# Patient Record
Sex: FEMALE | Race: BLACK OR AFRICAN AMERICAN | Employment: OTHER | URBAN - METROPOLITAN AREA
[De-identification: names, ages, dates, MRNs, and addresses within clinical notes are randomized per-mention and may not be internally consistent; named-entity substitution may affect disease eponyms.]

---

## 2017-01-23 ENCOUNTER — OFFICE VISIT (OUTPATIENT)
Dept: FAMILY MEDICINE CLINIC | Age: 47
End: 2017-01-23

## 2017-01-23 VITALS
BODY MASS INDEX: 50.79 KG/M2 | HEART RATE: 70 BPM | WEIGHT: 276 LBS | HEIGHT: 62 IN | RESPIRATION RATE: 18 BRPM | SYSTOLIC BLOOD PRESSURE: 129 MMHG | DIASTOLIC BLOOD PRESSURE: 84 MMHG | TEMPERATURE: 98 F | OXYGEN SATURATION: 98 %

## 2017-01-23 DIAGNOSIS — R20.0 NUMBNESS OF RIGHT HAND: Primary | ICD-10-CM

## 2017-01-23 DIAGNOSIS — R73.9 ELEVATED BLOOD SUGAR: ICD-10-CM

## 2017-01-23 DIAGNOSIS — I10 ESSENTIAL HYPERTENSION: ICD-10-CM

## 2017-01-23 RX ORDER — TOPIRAMATE 200 MG/1
50 TABLET ORAL 2 TIMES DAILY
COMMUNITY
End: 2017-10-16

## 2017-01-23 RX ORDER — POTASSIUM CHLORIDE 750 MG/1
TABLET, FILM COATED, EXTENDED RELEASE ORAL
Qty: 180 TAB | Refills: 1 | Status: SHIPPED | OUTPATIENT
Start: 2017-01-23 | End: 2018-05-31 | Stop reason: SDUPTHER

## 2017-01-23 NOTE — PROGRESS NOTES
Pt here c/o numbness in right fingers since last night. Denies having any pain at this time. Reports feeling weak and having no energy as well. Also c/o decrease in appetite x several weeks.

## 2017-01-23 NOTE — PROGRESS NOTES
Pt here c/o numbness in right fingers since last night. Denies having any pain at this time. Reports feeling weak and having no energy as well. Also c/o decrease in appetite x several weeks. Pt reports that she is having difficulty with depressed mood, is seeing a therapist. Pt reports that she lost her dog of 18 years 2 days ago. Subjective: (As above and below)     Chief Complaint   Patient presents with    Numbness     in right fingers     she is a 55y.o. year old female who presents for evaluation. Reviewed PmHx, RxHx, FmHx, SocHx, AllgHx and updated in chart. Review of Systems - negative except as listed above    Objective:     Vitals:    01/23/17 1451   BP: 129/84   Pulse: 70   Resp: 18   Temp: 98 °F (36.7 °C)   TempSrc: Oral   SpO2: 98%   Weight: 276 lb (125.2 kg)   Height: 5' 2\" (1.575 m)     Physical Examination: General appearance - alert, well appearing, and in no distress  Mental status - depressed, tearful  Eyes - pupils equal and reactive, extraocular eye movements intact  Mouth - mucous membranes moist, pharynx normal without lesions  Chest - clear to auscultation, no wheezes, rales or rhonchi, symmetric air entry  Heart - normal rate, regular rhythm, normal S1, S2, no murmurs, rubs, clicks or gallops  Musculoskeletal - no joint tenderness, deformity or swelling  Extremities - numbness in tips of right 2-4 fingers    Assessment/ Plan:   1. Numbness of right hand  -check EMG, symptoms intermittent with high risk repetitive job  - EMG TWO EXTREMITIES UPPER; Future    2. Essential hypertension  -well controlled  - CBC WITH AUTOMATED DIFF  - LIPID PANEL  - METABOLIC PANEL, COMPREHENSIVE  - TSH 3RD GENERATION  - VITAMIN D, 25 HYDROXY    3. Elevated blood sugar  -check labs  - HEMOGLOBIN A1C WITH EAG     Follow-up Disposition: As needed  I have discussed the diagnosis with the patient and the intended plan as seen in the above orders.   The patient has received an after-visit summary and questions were answered concerning future plans.      Medication Side Effects and Warnings were discussed with patient: yes  Patient Labs were reviewed: yes  Patient Past Records were reviewed:  yes    Margaret Mortimer, M.D.

## 2017-01-23 NOTE — MR AVS SNAPSHOT
Visit Information Date & Time Provider Department Dept. Phone Encounter #  
 1/23/2017  2:40 PM Tamra Stone MD [facility] [phone] [id number] Upcoming Health Maintenance Date Due Pneumococcal 19-64 Medium Risk (1 of 1 - PPSV23) 3/2/1989 DTaP/Tdap/Td series (1 - Tdap) 3/2/1991 PAP AKA CERVICAL CYTOLOGY 3/2/1991 INFLUENZA AGE 9 TO ADULT 8/1/2016 Allergies as of 1/23/2017  Review Complete On: 1/23/2017 By: Tamra Stone MD  
  
 Severity Noted Reaction Type Reactions Chocolate Flavor [Flavoring Agent] High 09/12/2014    Anaphylaxis Hazelnut [Tree Nut] High 09/12/2014    Anaphylaxis [facility] 09/12/2014    Anaphylaxis Other Medication High 05/02/2012    Anaphylaxis NUTS Peanut High 09/12/2014    Anaphylaxis Sunflower Oil High 09/12/2014    Anaphylaxis Amlodipine  09/12/2014    Itching Eye swelling Celebrex [Celecoxib]  05/02/2012    Itching Clindamycin  05/02/2012    Itching Doxycycline  05/02/2012    Itching Flagyl [Metronidazole]  05/02/2012    Itching Hydrocodone  09/12/2014    Hives, Itching Eye swelling Levofloxacin  09/12/2014    Hives, Shortness of Breath, Itching Lortab [Hydrocodone-acetaminophen]  05/02/2012    Itching Pcn [Penicillins]  05/02/2012    Itching Prednisone  05/02/2012    Itching Sesame Oil  09/12/2014    Hives, Shortness of Breath, Itching Shellfish Containing Products  05/02/2012    Itching Current Immunizations  Reviewed on 7/16/2015 No immunizations on file. Not reviewed this visit You Were Diagnosed With   
  
 Codes Comments Numbness of right hand    -  Primary ICD-10-CM: R20.0 ICD-9-CM: 782.0 Essential hypertension     ICD-10-CM: I10 
ICD-9-CM: 401.9 Elevated blood sugar     ICD-10-CM: R73.9 ICD-9-CM: 790.29 Vitals BP Pulse Temp Resp Height(growth percentile) Weight(growth percentile) 129/84 (BP 1 Location: Right arm, BP Patient Position: Sitting) 70 98 °F (36.7 °C) (Oral) 18 5' 2\" (1.575 m) 276 lb (125.2 kg) LMP SpO2 BMI OB Status Smoking Status 12/27/2016 (Approximate) 98% 50.48 kg/m2 Having regular periods Never Smoker Vitals History BMI and BSA Data Body Mass Index Body Surface Area 50.48 kg/m 2 2.34 m 2 Preferred Pharmacy Pharmacy Name Phone [facility] [address] [place] [address], [address] [place] [address] [phone] Your Updated Medication List  
  
   
This list is accurate as of: 1/23/17  3:20 PM.  Always use your most recent med list.  
  
  
  
  
 Kali New 250-50 mcg/dose diskus inhaler Generic drug:  fluticasone-salmeterol Take 1 Puff by inhalation every twelve (12) hours. * albuterol 5 mg/mL nebulizer solution Commonly known as:  PROVENTIL  
by Nebulization route every six (6) hours as needed. * PROAIR HFA 90 mcg/actuation inhaler Generic drug:  albuterol  
  
 albuterol-ipratropium 2.5 mg-0.5 mg/3 ml Nebu Commonly known as:  DUO-NEB  
3 mL by Nebulization route four (4) times daily. azelastine-fluticasone 137-50 mcg/spray [place]  
by Nasal route. butalbital-acetaminophen-caffeine -40 mg per tablet Commonly known as:  [employer] Take 1 Tab by mouth every six (6) hours as needed. Max Daily Amount: 4 Tabs. calcium 500 mg Tab Take  by mouth two (2) times a day. citalopram 20 mg tablet Commonly known as:  Rima Bongo Take 1 Tab by mouth daily. EPIPEN 0.3 mg/0.3 mL injection Generic drug:  EPINEPHrine  
0.3 mg by IntraMUSCular route once as needed. ergocalciferol 50,000 unit capsule Commonly known as:  ERGOCALCIFEROL  
  
 esomeprazole 40 mg capsule Commonly known as:  NEXIUM  
TAKE 1 CAPSULE BY MOUTH DAILY ferrous sulfate 325 mg (65 mg iron) tablet Take  by mouth two (2) times a day. fluconazole 150 mg tablet Commonly known as:  DIFLUCAN Take 1 Tab by mouth daily. guaiFENesin-codeine 100-10 mg/5 mL solution Commonly known as:  Elly Sera Take 5 mL by mouth three (3) times daily as needed for Cough. Max Daily Amount: 15 mL.  
  
 hydroCHLOROthiazide 25 mg tablet Commonly known as:  HYDRODIURIL Take 1 Tab by mouth daily. HYDROcodone-acetaminophen 7.5-325 mg per tablet Commonly known as:  Corine Ryder Take  by mouth. ibuprofen 800 mg tablet Commonly known as:  MOTRIN Take 1 Tab by mouth every eight (8) hours as needed for Pain. meclizine 12.5 mg tablet Commonly known as:  ANTIVERT Take 1 Tab by mouth three (3) times daily as needed for Dizziness or Nausea. methylPREDNISolone 4 mg tablet Commonly known as:  MEDROL (ESTER) Per dose pack instructions  
  
 mometasone 50 mcg/actuation nasal spray Commonly known as:  NASONEX  
2 Sprays by Both Nostrils route daily. ondansetron 4 mg disintegrating tablet Commonly known as:  ZOFRAN ODT Take 1 Tab by mouth every eight (8) hours as needed for Nausea. potassium chloride SR 10 mEq tablet Commonly known as:  KLOR-CON 10  
TAKE 2 TABLETS BY MOUTH DAILY  
  
 SINGULAIR 10 mg tablet Generic drug:  montelukast  
Take 10 mg by mouth daily. TOPAMAX 200 mg tablet Generic drug:  topiramate Take  by mouth two (2) times a day. XOLAIR 150 mg Solr Generic drug:  omalizumab  
  
 zolpidem 10 mg tablet Commonly known as:  AMBIEN Take 1 Tab by mouth nightly as needed for Sleep. Max Daily Amount: 10 mg.  
  
 * Notice: This list has 2 medication(s) that are the same as other medications prescribed for you. Read the directions carefully, and ask your doctor or other care provider to review them with you. We Performed the Following CBC WITH AUTOMATED DIFF [96632 CPT(R)] HEMOGLOBIN A1C WITH EAG [58567 CPT(R)] LIPID PANEL [23958 CPT(R)] METABOLIC PANEL, COMPREHENSIVE [79193 CPT(R)] TSH 3RD GENERATION [52714 CPT(R)] VITAMIN D, 25 HYDROXY [id number] CPT(R)] To-Do List   
 01/23/2017 Neurology:  EMG TWO EXTREMITIES UPPER Referral Information Referral ID Referred By Referred To  
  
 [id number] Monisha BLANCHARD Not Available Visits Status Start Date End Date 1 New Request 1/23/17 1/23/18 If your referral has a status of pending review or denied, additional information will be sent to support the outcome of this decision. Introducing [facility]! Allyson Anderson introduces crossvertise patient portal. Now you can access parts of your medical record, email your doctor's office, and request medication refills online. 1. In your internet browser, go to [email or web address] 2. Click on the First Time User? Click Here link in the Sign In box. You will see the New Member Sign Up page. 3. Enter your crossvertise Access Code exactly as it appears below. You will not need to use this code after youve completed the sign-up process. If you do not sign up before the expiration date, you must request a new code. · crossvertise Access Code: [email or web address] Expires: 3/9/2017  9:02 AM 
 
4. Enter the last four digits of your Social Security Number (xxxx) and Date of Birth (mm/dd/yyyy) as indicated and click Submit. You will be taken to the next sign-up page. 5. Create a crossvertise ID. This will be your crossvertise login ID and cannot be changed, so think of one that is secure and easy to remember. 6. Create a crossvertise password. You can change your password at any time. 7. Enter your Password Reset Question and Answer. This can be used at a later time if you forget your password. 8. Enter your e-mail address. You will receive e-mail notification when new information is available in [address] 9. Click Sign Up. You can now view and download portions of your medical record.  
10. Click the Download Summary menu link to download a portable copy of your medical information. If you have questions, please visit the Frequently Asked Questions section of the [employer] website. Remember, [employer] is NOT to be used for urgent needs. For medical emergencies, dial 911. Now available from your iPhone and Android! Please provide this summary of care documentation to your next provider. Your primary care clinician is listed as Pete Fitzgerald. If you have any questions after today's visit, please call [phone].

## 2017-01-24 LAB
25(OH)D3+25(OH)D2 SERPL-MCNC: 25.3 NG/ML (ref 30–100)
ALBUMIN SERPL-MCNC: 3.6 G/DL (ref 3.5–5.5)
ALBUMIN/GLOB SERPL: 1.3 {RATIO} (ref 1.1–2.5)
ALP SERPL-CCNC: 57 IU/L (ref 39–117)
ALT SERPL-CCNC: 12 IU/L (ref 0–32)
AST SERPL-CCNC: 14 IU/L (ref 0–40)
BASOPHILS # BLD AUTO: 0 X10E3/UL (ref 0–0.2)
BASOPHILS NFR BLD AUTO: 0 %
BILIRUB SERPL-MCNC: 0.3 MG/DL (ref 0–1.2)
BUN SERPL-MCNC: 10 MG/DL (ref 6–24)
BUN/CREAT SERPL: 12 (ref 9–23)
CALCIUM SERPL-MCNC: 8.7 MG/DL (ref 8.7–10.2)
CHLORIDE SERPL-SCNC: 105 MMOL/L (ref 96–106)
CHOLEST SERPL-MCNC: 192 MG/DL (ref 100–199)
CO2 SERPL-SCNC: 19 MMOL/L (ref 18–29)
CREAT SERPL-MCNC: 0.82 MG/DL (ref 0.57–1)
EOSINOPHIL # BLD AUTO: 0.1 X10E3/UL (ref 0–0.4)
EOSINOPHIL NFR BLD AUTO: 1 %
ERYTHROCYTE [DISTWIDTH] IN BLOOD BY AUTOMATED COUNT: 15.6 % (ref 12.3–15.4)
EST. AVERAGE GLUCOSE BLD GHB EST-MCNC: 114 MG/DL
GLOBULIN SER CALC-MCNC: 2.8 G/DL (ref 1.5–4.5)
GLUCOSE SERPL-MCNC: 75 MG/DL (ref 65–99)
HBA1C MFR BLD: 5.6 % (ref 4.8–5.6)
HCT VFR BLD AUTO: 39.9 % (ref 34–46.6)
HDLC SERPL-MCNC: 38 MG/DL
HGB BLD-MCNC: 13.7 G/DL (ref 11.1–15.9)
IMM GRANULOCYTES # BLD: 0 X10E3/UL (ref 0–0.1)
IMM GRANULOCYTES NFR BLD: 0 %
INTERPRETATION, 910389: NORMAL
LDLC SERPL CALC-MCNC: 133 MG/DL (ref 0–99)
LYMPHOCYTES # BLD AUTO: 2.1 X10E3/UL (ref 0.7–3.1)
LYMPHOCYTES NFR BLD AUTO: 25 %
MCH RBC QN AUTO: 27.7 PG (ref 26.6–33)
MCHC RBC AUTO-ENTMCNC: 34.3 G/DL (ref 31.5–35.7)
MCV RBC AUTO: 81 FL (ref 79–97)
MONOCYTES # BLD AUTO: 0.4 X10E3/UL (ref 0.1–0.9)
MONOCYTES NFR BLD AUTO: 5 %
NEUTROPHILS # BLD AUTO: 5.7 X10E3/UL (ref 1.4–7)
NEUTROPHILS NFR BLD AUTO: 69 %
PLATELET # BLD AUTO: 370 X10E3/UL (ref 150–379)
POTASSIUM SERPL-SCNC: 4.1 MMOL/L (ref 3.5–5.2)
PROT SERPL-MCNC: 6.4 G/DL (ref 6–8.5)
RBC # BLD AUTO: 4.94 X10E6/UL (ref 3.77–5.28)
SODIUM SERPL-SCNC: 141 MMOL/L (ref 134–144)
TRIGL SERPL-MCNC: 105 MG/DL (ref 0–149)
TSH SERPL DL<=0.005 MIU/L-ACNC: 1.19 UIU/ML (ref 0.45–4.5)
VLDLC SERPL CALC-MCNC: 21 MG/DL (ref 5–40)
WBC # BLD AUTO: 8.4 X10E3/UL (ref 3.4–10.8)

## 2017-01-24 NOTE — PROGRESS NOTES
Cholesterol is elevated, please closely monitor diet and increase exercise, recheck in 6 months. Vitamin D is slightly low, please take a daily supplement of at least 2000 IU (international units) daily. All other labs are within normal limits.    Please inform

## 2017-02-22 ENCOUNTER — OFFICE VISIT (OUTPATIENT)
Dept: FAMILY MEDICINE CLINIC | Age: 47
End: 2017-02-22

## 2017-02-22 VITALS
WEIGHT: 269 LBS | OXYGEN SATURATION: 98 % | SYSTOLIC BLOOD PRESSURE: 128 MMHG | TEMPERATURE: 98.4 F | DIASTOLIC BLOOD PRESSURE: 74 MMHG | HEIGHT: 62 IN | RESPIRATION RATE: 18 BRPM | BODY MASS INDEX: 49.5 KG/M2 | HEART RATE: 76 BPM

## 2017-02-22 DIAGNOSIS — L24.9 IRRITANT CONTACT DERMATITIS, UNSPECIFIED TRIGGER: ICD-10-CM

## 2017-02-22 DIAGNOSIS — J32.0 CHRONIC MAXILLARY SINUSITIS: Primary | ICD-10-CM

## 2017-02-22 RX ORDER — AZITHROMYCIN 250 MG/1
TABLET, FILM COATED ORAL
Qty: 6 TAB | Refills: 0 | Status: SHIPPED | OUTPATIENT
Start: 2017-02-22 | End: 2017-02-27

## 2017-02-22 RX ORDER — TRIAMCINOLONE ACETONIDE 5 MG/G
CREAM TOPICAL 2 TIMES DAILY
Qty: 60 G | Refills: 0 | Status: SHIPPED | OUTPATIENT
Start: 2017-02-22 | End: 2017-08-14 | Stop reason: ALTCHOICE

## 2017-02-22 RX ORDER — PREDNISONE 10 MG/1
TABLET ORAL
Qty: 6 TAB | Refills: 0 | Status: SHIPPED | OUTPATIENT
Start: 2017-02-22 | End: 2017-08-14 | Stop reason: ALTCHOICE

## 2017-02-22 NOTE — PROGRESS NOTES
Pt here c/o frequent HA's and fatigue x 1 month. Reports having body aches and cough that started last week. Also c/o rash on chest and arms x 2 weeks. States that rash is not itchy or painful. Subjective: (As above and below)     Chief Complaint   Patient presents with    Headache    Fatigue    Rash     she is a 55y.o. year old female who presents for evaluation. Reviewed PmHx, RxHx, FmHx, SocHx, AllgHx and updated in chart. Review of Systems - negative except as listed above    Objective:     Vitals:    02/22/17 1339   BP: 128/74   Pulse: 76   Resp: 18   Temp: 98.4 °F (36.9 °C)   TempSrc: Oral   SpO2: 98%   Weight: 269 lb (122 kg)   Height: 5' 2\" (1.575 m)     Physical Examination: General appearance - alert, well appearing, and in no distress  Mental status - normal mood, behavior, speech, dress, motor activity, and thought processes  Eyes - pupils equal and reactive, extraocular eye movements intact  Mouth - mucous membranes moist, pharynx normal without lesions  Nasal congestion  Chest - wheezing noted upper airways  Heart - normal rate, regular rhythm, normal S1, S2, no murmurs, rubs, clicks or gallops  Musculoskeletal - no joint tenderness, deformity or swelling  Skin - areas of irritation in chest and upper arms    Assessment/ Plan:   1. Chronic maxillary sinusitis  -treat with zpak and prednisone    2. Irritant contact dermatitis, unspecified trigger  -triamcinolone as written     Follow-up Disposition: As needed  I have discussed the diagnosis with the patient and the intended plan as seen in the above orders. The patient has received an after-visit summary and questions were answered concerning future plans.      Medication Side Effects and Warnings were discussed with patient: yes  Patient Labs were reviewed: yes  Patient Past Records were reviewed:  yes    Margaret Mortimer, M.D.

## 2017-02-22 NOTE — MR AVS SNAPSHOT
Visit Information Date & Time Provider Department Dept. Phone Encounter #  
 2/22/2017  1:20 PM Marifer Fitzgerald MD [address] [facility] [phone] [id number] Upcoming Health Maintenance Date Due Pneumococcal 19-64 Medium Risk (1 of 1 - PPSV23) 3/2/1989 DTaP/Tdap/Td series (1 - Tdap) 3/2/1991 PAP AKA CERVICAL CYTOLOGY 3/2/1991 INFLUENZA AGE 9 TO ADULT 8/1/2016 Allergies as of 2/22/2017  Review Complete On: 2/22/2017 By: Marybel York MD  
  
 Severity Noted Reaction Type Reactions Chocolate Flavor [Flavoring Agent] High 09/12/2014    Anaphylaxis Hazelnut [Tree Nut] High 09/12/2014    Anaphylaxis [facility] 09/12/2014    Anaphylaxis Other Medication High 05/02/2012    Anaphylaxis NUTS Peanut High 09/12/2014    Anaphylaxis Sunflower Oil High 09/12/2014    Anaphylaxis Amlodipine  09/12/2014    Itching Eye swelling Celebrex [Celecoxib]  05/02/2012    Itching Clindamycin  05/02/2012    Itching Doxycycline  05/02/2012    Itching Flagyl [Metronidazole]  05/02/2012    Itching Hydrocodone  09/12/2014    Hives, Itching Eye swelling Levofloxacin  09/12/2014    Hives, Shortness of Breath, Itching Lortab [Hydrocodone-acetaminophen]  05/02/2012    Itching Pcn [Penicillins]  05/02/2012    Itching Prednisone  05/02/2012    Itching Sesame Oil  09/12/2014    Hives, Shortness of Breath, Itching Shellfish Containing Products  05/02/2012    Itching Current Immunizations  Reviewed on 7/16/2015 No immunizations on file. Not reviewed this visit You Were Diagnosed With   
  
 Codes Comments Chronic maxillary sinusitis    -  Primary ICD-10-CM: J32.0 ICD-9-CM: 473.0 Irritant contact dermatitis, unspecified trigger     ICD-10-CM: L24.9 ICD-9-CM: 692.9 Vitals BP  
  
  
  
  
  
 128/74 (BP 1 Location: Left arm, BP Patient Position: Sitting) Vitals History BMI and BSA Data Body Mass Index Body Surface Area  
 49.2 kg/m 2 2.31 m 2 Preferred Pharmacy Pharmacy Name Phone [facility] [address] [facility],  Joan Garcia [address] [place] [address] [phone] Your Updated Medication List  
  
   
This list is accurate as of: 2/22/17  1:57 PM.  Always use your most recent med list.  
  
  
  
  
 Tamara Blanca 250-50 mcg/dose diskus inhaler Generic drug:  fluticasone-salmeterol Take 1 Puff by inhalation every twelve (12) hours. * albuterol 5 mg/mL nebulizer solution Commonly known as:  PROVENTIL  
by Nebulization route every six (6) hours as needed. * PROAIR HFA 90 mcg/actuation inhaler Generic drug:  albuterol  
  
 albuterol-ipratropium 2.5 mg-0.5 mg/3 ml Nebu Commonly known as:  DUO-NEB  
3 mL by Nebulization route four (4) times daily. azelastine-fluticasone 137-50 mcg/spray [place]  
by Nasal route. azithromycin 250 mg tablet Commonly known as:  Arthea Bruch Take 2 tablets today, then take 1 tablet daily  
  
 butalbital-acetaminophen-caffeine -40 mg per tablet Commonly known as:  [employer] Take 1 Tab by mouth every six (6) hours as needed. Max Daily Amount: 4 Tabs. calcium 500 mg Tab Take  by mouth two (2) times a day. citalopram 20 mg tablet Commonly known as:  Romano Lindau Take 1 Tab by mouth daily. EPIPEN 0.3 mg/0.3 mL injection Generic drug:  EPINEPHrine  
0.3 mg by IntraMUSCular route once as needed. ergocalciferol 50,000 unit capsule Commonly known as:  ERGOCALCIFEROL  
  
 esomeprazole 40 mg capsule Commonly known as:  NEXIUM  
TAKE 1 CAPSULE BY MOUTH DAILY ferrous sulfate 325 mg (65 mg iron) tablet Take  by mouth two (2) times a day. fluconazole 150 mg tablet Commonly known as:  DIFLUCAN Take 1 Tab by mouth daily. guaiFENesin-codeine 100-10 mg/5 mL solution Commonly known as:  Yumiko Hair  
 Take 5 mL by mouth three (3) times daily as needed for Cough. Max Daily Amount: 15 mL.  
  
 hydroCHLOROthiazide 25 mg tablet Commonly known as:  HYDRODIURIL Take 1 Tab by mouth daily. HYDROcodone-acetaminophen 7.5-325 mg per tablet Commonly known as:  Annamary Angel Take  by mouth. ibuprofen 800 mg tablet Commonly known as:  MOTRIN Take 1 Tab by mouth every eight (8) hours as needed for Pain. meclizine 12.5 mg tablet Commonly known as:  ANTIVERT Take 1 Tab by mouth three (3) times daily as needed for Dizziness or Nausea. methylPREDNISolone 4 mg tablet Commonly known as:  MEDROL (ESTER) Per dose pack instructions  
  
 mometasone 50 mcg/actuation nasal spray Commonly known as:  NASONEX  
2 Sprays by Both Nostrils route daily. ondansetron 4 mg disintegrating tablet Commonly known as:  ZOFRAN ODT Take 1 Tab by mouth every eight (8) hours as needed for Nausea. potassium chloride SR 10 mEq tablet Commonly known as:  KLOR-CON 10  
TAKE 2 TABLETS BY MOUTH DAILY predniSONE 10 mg tablet Commonly known as:  Ronni Dally Please take 30mg on day one, then 20mg on day 2, then 10 mg on day 3 SINGULAIR 10 mg tablet Generic drug:  montelukast  
Take 10 mg by mouth daily. TOPAMAX 200 mg tablet Generic drug:  topiramate Take  by mouth two (2) times a day. triamcinolone 0.5 % topical cream  
Commonly known as:  ARISTOCORT Apply  to affected area two (2) times a day. use thin layer XOLAIR 150 mg Solr Generic drug:  omalizumab  
  
 zolpidem 10 mg tablet Commonly known as:  AMBIEN Take 1 Tab by mouth nightly as needed for Sleep. Max Daily Amount: 10 mg.  
  
 * Notice: This list has 2 medication(s) that are the same as other medications prescribed for you. Read the directions carefully, and ask your doctor or other care provider to review them with you. Prescriptions Sent to Pharmacy Refills predniSONE (DELTASONE) 10 mg tablet 0 Sig: Please take 30mg on day one, then 20mg on day 2, then 10 mg on day 3 Class: Normal  
 Pharmacy: [facility] [address] [place], [place] [address]y [address] [place] [address] E Ph #: [phone]  
 azithromycin (ZITHROMAX) 250 mg tablet 0 Sig: Take 2 tablets today, then take 1 tablet daily Class: Normal  
 Pharmacy: [facility] [address] [place] [address] [address] [place] [address] E Ph #: [phone]  
 triamcinolone (ARISTOCORT) 0.5 % topical cream 0 Sig: Apply  to affected area two (2) times a day. use thin layer Class: Normal  
 Pharmacy: [facility] [address] [place] [address] [address] [place] [address] E Ph #: [phone] Route: Topical  
  
Introducing [facility]! Priyanka Raymond introduces [employer] patient portal. Now you can access parts of your medical record, email your doctor's office, and request medication refills online. 1. In your internet browser, go to [email or web address] 2. Click on the First Time User? Click Here link in the Sign In box. You will see the New Member Sign Up page. 3. Enter your Hull Access Code exactly as it appears below. You will not need to use this code after youve completed the sign-up process. If you do not sign up before the expiration date, you must request a new code. · Hull Access Code: [email or web address] Expires: 3/9/2017  9:02 AM 
 
4. Enter the last four digits of your Social Security Number (xxxx) and Date of Birth (mm/dd/yyyy) as indicated and click Submit. You will be taken to the next sign-up page. 5. Create a [employer]ric[employer] ID. This will be your [employer] login ID and cannot be changed, so think of one that is secure and easy to remember. 6. Create a Hull password. You can change your password at any time. 7. Enter your Password Reset Question and Answer.  This can be used at a later time if you forget your password. 8. Enter your e-mail address. You will receive e-mail notification when new information is available in [address] 9. Click Sign Up. You can now view and download portions of your medical record. 10. Click the Download Summary menu link to download a portable copy of your medical information. If you have questions, please visit the Frequently Asked Questions section of the [employer] website. Remember, [employer] is NOT to be used for urgent needs. For medical emergencies, dial 911. Now available from your iPhone and Android! Please provide this summary of care documentation to your next provider. Your primary care clinician is listed as Pete Fitzgerald. If you have any questions after today's visit, please call [phone].

## 2017-02-22 NOTE — LETTER
NOTIFICATION RETURN TO WORK  
 
2/22/2017 1:55 PM 
 
Ms. Warden Vitale 
[facility] [id number] To Whom It May Concern: 
 
Warden Vitale is currently under the care of [address]. Please excuse from work 2/21/17-2/23/17 due to medical illness. If there are questions or concerns please have the patient contact our office. Sincerely, Morrison Cogan, MD

## 2017-02-22 NOTE — PROGRESS NOTES
Pt here c/o frequent HA's and fatigue x 1 month. Reports having body aches and cough that started last week. Also c/o rash on chest and arms x 2 weeks. States that rash is not itchy or painful.

## 2017-03-15 ENCOUNTER — OFFICE VISIT (OUTPATIENT)
Dept: FAMILY MEDICINE CLINIC | Age: 47
End: 2017-03-15

## 2017-03-15 ENCOUNTER — DOCUMENTATION ONLY (OUTPATIENT)
Dept: FAMILY MEDICINE CLINIC | Age: 47
End: 2017-03-15

## 2017-03-15 VITALS
HEART RATE: 66 BPM | TEMPERATURE: 98.4 F | WEIGHT: 269.1 LBS | HEIGHT: 62 IN | SYSTOLIC BLOOD PRESSURE: 124 MMHG | DIASTOLIC BLOOD PRESSURE: 82 MMHG | BODY MASS INDEX: 49.52 KG/M2 | OXYGEN SATURATION: 99 % | RESPIRATION RATE: 16 BRPM

## 2017-03-15 DIAGNOSIS — R05.9 COUGH: ICD-10-CM

## 2017-03-15 DIAGNOSIS — J18.9 CAP (COMMUNITY ACQUIRED PNEUMONIA): Primary | ICD-10-CM

## 2017-03-15 DIAGNOSIS — G43.011 INTRACTABLE MIGRAINE WITHOUT AURA AND WITH STATUS MIGRAINOSUS: ICD-10-CM

## 2017-03-15 RX ORDER — TOPIRAMATE 100 MG/1
TABLET, FILM COATED ORAL
COMMUNITY
Start: 2017-03-10 | End: 2017-08-14 | Stop reason: ALTCHOICE

## 2017-03-15 RX ORDER — CEFDINIR 300 MG/1
300 CAPSULE ORAL 2 TIMES DAILY
Qty: 20 CAP | Refills: 0 | Status: SHIPPED | OUTPATIENT
Start: 2017-03-15 | End: 2017-03-25

## 2017-03-15 RX ORDER — AZELASTINE 1 MG/ML
SPRAY, METERED NASAL
Refills: 3 | COMMUNITY
Start: 2017-03-01 | End: 2017-08-14 | Stop reason: ALTCHOICE

## 2017-03-15 RX ORDER — FLUCONAZOLE 150 MG/1
150 TABLET ORAL DAILY
Qty: 2 TAB | Refills: 1 | Status: SHIPPED | OUTPATIENT
Start: 2017-03-15 | End: 2017-03-16

## 2017-03-15 RX ORDER — FLUTICASONE PROPIONATE AND SALMETEROL 50; 500 UG/1; UG/1
1 POWDER RESPIRATORY (INHALATION) 2 TIMES DAILY
COMMUNITY
Start: 2017-02-08

## 2017-03-15 RX ORDER — VERAPAMIL HYDROCHLORIDE 180 MG/1
CAPSULE, EXTENDED RELEASE ORAL
COMMUNITY
Start: 2017-03-10 | End: 2017-08-14 | Stop reason: ALTCHOICE

## 2017-03-15 RX ORDER — PROMETHAZINE HYDROCHLORIDE AND CODEINE PHOSPHATE 6.25; 1 MG/5ML; MG/5ML
1 SOLUTION ORAL
Qty: 120 ML | Refills: 0 | Status: SHIPPED | OUTPATIENT
Start: 2017-03-15 | End: 2017-08-14 | Stop reason: ALTCHOICE

## 2017-03-15 NOTE — MR AVS SNAPSHOT
Visit Information Date & Time Provider Department Dept. Phone Encounter #  
 3/15/2017  2:45 PM Melani Perla NP [address] [facility] [phone] [id number] Follow-up Instructions Return if symptoms worsen or fail to improve. Upcoming Health Maintenance Date Due Pneumococcal 19-64 Medium Risk (1 of 1 - PPSV23) 3/2/1989 DTaP/Tdap/Td series (1 - Tdap) 3/2/1991 PAP AKA CERVICAL CYTOLOGY 3/2/1991 INFLUENZA AGE 9 TO ADULT 8/1/2016 Allergies as of 3/15/2017  Review Complete On: 3/15/2017 By: Melani Perla NP Severity Noted Reaction Type Reactions Chocolate Flavor [Flavoring Agent] High 09/12/2014    Anaphylaxis Hazelnut [Tree Nut] High 09/12/2014    Anaphylaxis [facility] 09/12/2014    Anaphylaxis Other Medication High 05/02/2012    Anaphylaxis NUTS Peanut High 09/12/2014    Anaphylaxis Sunflower Oil High 09/12/2014    Anaphylaxis Amlodipine  09/12/2014    Itching Eye swelling Celebrex [Celecoxib]  05/02/2012    Itching Clindamycin  05/02/2012    Itching Doxycycline  05/02/2012    Itching Flagyl [Metronidazole]  05/02/2012    Itching Hydrocodone  09/12/2014    Hives, Itching Eye swelling Levofloxacin  09/12/2014    Hives, Shortness of Breath, Itching Lortab [Hydrocodone-acetaminophen]  05/02/2012    Itching Pcn [Penicillins]  05/02/2012    Itching Prednisone  05/02/2012    Itching Sesame Oil  09/12/2014    Hives, Shortness of Breath, Itching Shellfish Containing Products  05/02/2012    Itching Current Immunizations  Reviewed on 7/16/2015 No immunizations on file. Not reviewed this visit You Were Diagnosed With   
  
 Codes Comments CAP (community acquired pneumonia)    -  Primary ICD-10-CM: J18.9 ICD-9-CM: 025 Intractable migraine without aura and with status migrainosus     ICD-10-CM: G43.011 
ICD-9-CM: 346.13 Cough     ICD-10-CM: R05 ICD-9-CM: 786.2 Vitals BP Pulse Temp Resp Height(growth percentile) Weight(growth percentile) 124/82 (BP 1 Location: Left arm, BP Patient Position: Sitting) 66 98.4 °F (36.9 °C) (Oral) 16 5' 2\" (1.575 m) 269 lb 1.6 oz (122.1 kg) LMP SpO2 BMI OB Status Smoking Status 03/01/2017 99% 49.22 kg/m2 Having regular periods Never Smoker Vitals History BMI and BSA Data Body Mass Index Body Surface Area  
 49.22 kg/m 2 2.31 m 2 Preferred Pharmacy Pharmacy Name Phone [facility] [address] [facility], [address] [place] [address] [phone] Your Updated Medication List  
  
   
This list is accurate as of: 3/15/17  3:29 PM.  Always use your most recent med list.  
  
  
  
  
 * ADVAIR DISKUS 250-50 mcg/dose diskus inhaler Generic drug:  fluticasone-salmeterol Take 1 Puff by inhalation every twelve (12) hours. * ADVAIR DISKUS 500-50 mcg/dose diskus inhaler Generic drug:  fluticasone-salmeterol * albuterol 5 mg/mL nebulizer solution Commonly known as:  PROVENTIL  
by Nebulization route every six (6) hours as needed. * PROAIR HFA 90 mcg/actuation inhaler Generic drug:  albuterol  
  
 albuterol-ipratropium 2.5 mg-0.5 mg/3 ml Nebu Commonly known as:  DUO-NEB  
3 mL by Nebulization route four (4) times daily. azelastine 137 mcg (0.1 %) nasal spray Commonly known as:  ASTELIN  
U 2 SPRAYS IN THE NOSTRIL BID  
  
 azelastine-fluticasone 137-50 mcg/spray [place]  
by Nasal route. butalbital-acetaminophen-caffeine -40 mg per tablet Commonly known as:  [employer] Take 1 Tab by mouth every six (6) hours as needed. Max Daily Amount: 4 Tabs. calcium 500 mg Tab Take  by mouth two (2) times a day. cefdinir 300 mg capsule Commonly known as:  OMNICEF Take 1 Cap by mouth two (2) times a day for 10 days. citalopram 20 mg tablet Commonly known as:  Verlin Lamin Take 1 Tab by mouth daily. EPIPEN 0.3 mg/0.3 mL injection Generic drug:  EPINEPHrine  
0.3 mg by IntraMUSCular route once as needed. ergocalciferol 50,000 unit capsule Commonly known as:  ERGOCALCIFEROL  
  
 esomeprazole 40 mg capsule Commonly known as:  NEXIUM  
TAKE 1 CAPSULE BY MOUTH DAILY ferrous sulfate 325 mg (65 mg iron) tablet Take  by mouth two (2) times a day. fluconazole 150 mg tablet Commonly known as:  DIFLUCAN Take 1 Tab by mouth daily. hydroCHLOROthiazide 25 mg tablet Commonly known as:  HYDRODIURIL Take 1 Tab by mouth daily. HYDROcodone-acetaminophen 7.5-325 mg per tablet Commonly known as:  Julianna Howe Take  by mouth. ibuprofen 800 mg tablet Commonly known as:  MOTRIN Take 1 Tab by mouth every eight (8) hours as needed for Pain. meclizine 12.5 mg tablet Commonly known as:  ANTIVERT Take 1 Tab by mouth three (3) times daily as needed for Dizziness or Nausea. methylPREDNISolone (PF) 125 mg/2 mL Solr Commonly known as:  SOLU-MEDROL 2 mL by IntraVENous route once for 1 dose. methylPREDNISolone 4 mg tablet Commonly known as:  MEDROL (ESTER) Per dose pack instructions  
  
 mometasone 50 mcg/actuation nasal spray Commonly known as:  NASONEX  
2 Sprays by Both Nostrils route daily. ondansetron 4 mg disintegrating tablet Commonly known as:  ZOFRAN ODT Take 1 Tab by mouth every eight (8) hours as needed for Nausea. potassium chloride SR 10 mEq tablet Commonly known as:  KLOR-CON 10  
TAKE 2 TABLETS BY MOUTH DAILY predniSONE 10 mg tablet Commonly known as:  Siomara Ramos Please take 30mg on day one, then 20mg on day 2, then 10 mg on day 3  
  
 promethazine-codeine 6.25-10 mg/5 mL syrup Commonly known as:  PHENERGAN with CODEINE Take 5 mL by mouth every six (6) hours as needed for Cough. Max Daily Amount: 20 mL. SINGULAIR 10 mg tablet Generic drug:  montelukast  
Take 10 mg by mouth daily. * TOPAMAX 200 mg tablet Generic drug:  topiramate Take  by mouth two (2) times a day. * topiramate 100 mg tablet Commonly known as:  TOPAMAX  
  
 triamcinolone 0.5 % topical cream  
Commonly known as:  ARISTOCORT Apply  to affected area two (2) times a day. use thin layer  
  
 verapamil  mg CR capsule Commonly known as:  VERELAN  
  
 XOLAIR 150 mg Solr Generic drug:  omalizumab  
  
 zolpidem 10 mg tablet Commonly known as:  AMBIEN Take 1 Tab by mouth nightly as needed for Sleep. Max Daily Amount: 10 mg.  
  
 * Notice: This list has 6 medication(s) that are the same as other medications prescribed for you. Read the directions carefully, and ask your doctor or other care provider to review them with you. Prescriptions Printed Refills  
 promethazine-codeine (PHENERGAN WITH CODEINE) 6.25-10 mg/5 mL syrup 0 Sig: Take 5 mL by mouth every six (6) hours as needed for Cough. Max Daily Amount: 20 mL. Class: Print Route: Oral  
  
Prescriptions Sent to Pharmacy Refills  
 cefdinir (OMNICEF) 300 mg capsule 0 Sig: Take 1 Cap by mouth two (2) times a day for 10 days. Class: Normal  
 Pharmacy: [facility] [address] [place] [address] [address] [place] [address] #: [phone] Route: Oral  
  
We Performed the Following METHYLPREDNISOLONE INJECTION [ [facility]] [place] THER/PROPH/DIAG INJECTION, SUBCUT/IM [id number] CPT(R)] Follow-up Instructions Return if symptoms worsen or fail to improve. Patient Instructions Pneumonia: Care Instructions Your Care Instructions Pneumonia is an infection of the lungs. Most cases are caused by infections from bacteria or viruses. Pneumonia may be mild or very severe. If it is caused by bacteria, you will be treated with antibiotics.  It may take a few weeks to a few months to recover fully from pneumonia, depending on how sick you were and whether your overall health is good. Follow-up care is a key part of your treatment and safety. Be sure to make and go to all appointments, and call your doctor if you are having problems. Its also a good idea to know your test results and keep a list of the medicines you take. How can you care for yourself at home? · Take your antibiotics exactly as directed. Do not stop taking the medicine just because you are feeling better. You need to take the full course of antibiotics. · Take your medicines exactly as prescribed. Call your doctor if you think you are having a problem with your medicine. · Get plenty of rest and sleep. You may feel weak and tired for a while, but your energy level will improve with time. · To prevent dehydration, drink plenty of fluids, enough so that your urine is light yellow or clear like water. Choose water and other caffeine-free clear liquids until you feel better. If you have kidney, heart, or liver disease and have to limit fluids, talk with your doctor before you increase the amount of fluids you drink. · Take care of your cough so you can rest. A cough that brings up mucus from your lungs is common with pneumonia. It is one way your body gets rid of the infection. But if coughing keeps you from resting or causes severe fatigue and chest-wall pain, talk to your doctor. He or she may suggest that you take a medicine to reduce the cough. · Use a vaporizer or humidifier to add moisture to your bedroom. Follow the directions for cleaning the machine. · Do not smoke or allow others to smoke around you. Smoke will make your cough last longer. If you need help quitting, talk to your doctor about stop-smoking programs and medicines. These can increase your chances of quitting for good. · Take an over-the-counter pain medicine, such as acetaminophen (Tylenol), ibuprofen (Advil, Motrin), or naproxen (Aleve). Read and follow all instructions on the label. · Do not take two or more pain medicines at the same time unless the doctor told you to. Many pain medicines have acetaminophen, which is Tylenol. Too much acetaminophen (Tylenol) can be harmful. · If you were given a spirometer to measure how well your lungs are working, use it as instructed. This can help your doctor tell how your recovery is going. · To prevent pneumonia in the future, talk to your doctor about getting a flu vaccine (once a year) and a pneumococcal vaccine (one time only for most people). When should you call for help? Call 911 anytime you think you may need emergency care. For example, call if: 
· You have severe trouble breathing. Call your doctor now or seek immediate medical care if: 
· You cough up dark brown or bloody mucus (sputum). · You have new or worse trouble breathing. · You are dizzy or lightheaded, or you feel like you may faint. Watch closely for changes in your health, and be sure to contact your doctor if: 
· You have a new or higher fever. · You are coughing more deeply or more often. · You are not getting better after 2 days (48 hours). · You do not get better as expected. Where can you learn more? Go to http://horacio-ary.info/. Enter [id number] in the search box to learn more about \"Pneumonia: Care Instructions. \" Current as of: May 23, 2016 Content Version: 11.1 © 9576-7336 [employer]. Care instructions adapted under license by [employer] (which disclaims liability or warranty for this information). If you have questions about a medical condition or this instruction, always ask your healthcare professional. [address] any warranty or liability for your use of this information. Introducing [facility]! [facility] introduces [employer] patient portal. Now you can access parts of your medical record, email your doctor's office, and request medication refills online. 1. In your internet browser, go to [email or web address] 2. Click on the First Time User? Click Here link in the Sign In box. You will see the New Member Sign Up page. 3. Enter your [employer] Access Code exactly as it appears below. You will not need to use this code after youve completed the sign-up process. If you do not sign up before the expiration date, you must request a new code. · [employer] Access Code: [email or web address] Expires: 6/13/2017  3:29 PM 
 
4. Enter the last four digits of your Social Security Number (xxxx) and Date of Birth (mm/dd/yyyy) as indicated and click Submit. You will be taken to the next sign-up page. 5. Create a [employer] ID. This will be your [employer] login ID and cannot be changed, so think of one that is secure and easy to remember. 6. Create a [employer] password. You can change your password at any time. 7. Enter your Password Reset Question and Answer. This can be used at a later time if you forget your password. 8. Enter your e-mail address. You will receive e-mail notification when new information is available in [address] 9. Click Sign Up. You can now view and download portions of your medical record. 10. Click the Download Summary menu link to download a portable copy of your medical information. If you have questions, please visit the Frequently Asked Questions section of the [employer] website. Remember, [employer] is NOT to be used for urgent needs. For medical emergencies, dial 911. Now available from your iPhone and Android! Please provide this summary of care documentation to your next provider. Your primary care clinician is listed as Pete Fitzgerald. If you have any questions after today's visit, please call [phone].

## 2017-03-15 NOTE — PROGRESS NOTES
Patient dropped off [facility] [facility] forms for [employer] to be completed and faxed to [phone]    She may be reached at [phone]

## 2017-03-15 NOTE — PATIENT INSTRUCTIONS
Pneumonia: Care Instructions  Your Care Instructions    Pneumonia is an infection of the lungs. Most cases are caused by infections from bacteria or viruses. Pneumonia may be mild or very severe. If it is caused by bacteria, you will be treated with antibiotics. It may take a few weeks to a few months to recover fully from pneumonia, depending on how sick you were and whether your overall health is good. Follow-up care is a key part of your treatment and safety. Be sure to make and go to all appointments, and call your doctor if you are having problems. Its also a good idea to know your test results and keep a list of the medicines you take. How can you care for yourself at home? · Take your antibiotics exactly as directed. Do not stop taking the medicine just because you are feeling better. You need to take the full course of antibiotics. · Take your medicines exactly as prescribed. Call your doctor if you think you are having a problem with your medicine. · Get plenty of rest and sleep. You may feel weak and tired for a while, but your energy level will improve with time. · To prevent dehydration, drink plenty of fluids, enough so that your urine is light yellow or clear like water. Choose water and other caffeine-free clear liquids until you feel better. If you have kidney, heart, or liver disease and have to limit fluids, talk with your doctor before you increase the amount of fluids you drink. · Take care of your cough so you can rest. A cough that brings up mucus from your lungs is common with pneumonia. It is one way your body gets rid of the infection. But if coughing keeps you from resting or causes severe fatigue and chest-wall pain, talk to your doctor. He or she may suggest that you take a medicine to reduce the cough. · Use a vaporizer or humidifier to add moisture to your bedroom. Follow the directions for cleaning the machine. · Do not smoke or allow others to smoke around you.  Smoke will make your cough last longer. If you need help quitting, talk to your doctor about stop-smoking programs and medicines. These can increase your chances of quitting for good. · Take an over-the-counter pain medicine, such as acetaminophen (Tylenol), ibuprofen (Advil, Motrin), or naproxen (Aleve). Read and follow all instructions on the label. · Do not take two or more pain medicines at the same time unless the doctor told you to. Many pain medicines have acetaminophen, which is Tylenol. Too much acetaminophen (Tylenol) can be harmful. · If you were given a spirometer to measure how well your lungs are working, use it as instructed. This can help your doctor tell how your recovery is going. · To prevent pneumonia in the future, talk to your doctor about getting a flu vaccine (once a year) and a pneumococcal vaccine (one time only for most people). When should you call for help? Call 911 anytime you think you may need emergency care. For example, call if:  · You have severe trouble breathing. Call your doctor now or seek immediate medical care if:  · You cough up dark brown or bloody mucus (sputum). · You have new or worse trouble breathing. · You are dizzy or lightheaded, or you feel like you may faint. Watch closely for changes in your health, and be sure to contact your doctor if:  · You have a new or higher fever. · You are coughing more deeply or more often. · You are not getting better after 2 days (48 hours). · You do not get better as expected. Where can you learn more? Go to http://horacio-ary.info/. Enter [id number] in the search box to learn more about \"Pneumonia: Care Instructions. \"  Current as of: May 23, 2016  Content Version: 11.1  © 5562-8510 [employer]. Care instructions adapted under license by [employer] (which disclaims liability or warranty for this information).  If you have questions about a medical condition or this instruction, always ask your healthcare professional. [place] [address] any warranty or liability for your use of this information.

## 2017-03-15 NOTE — PROGRESS NOTES
Chief Complaint   Patient presents with    Cold Symptoms     Headche, Congestion, Sinus Pressure, Dizzy X3 days     she is a 52y.o. year old female who presents for evalution. Pt states has been feeling poorly since February. Lots of coughing, congestion, dizziness, sinus pain and pressure. Has been taking Mucinex but makes her stomach upset. Pt states course starting worsening on Saturday, significant dizziness. No fever or chills. Pt has seen ENT at beginning of month for chronic sinus issues. Reviewed PmHx, RxHx, FmHx, SocHx, AllgHx and updated and dated in the chart. Review of Systems - negative except as listed above in the HPI    Objective:     Vitals:    03/15/17 1450   BP: 124/82   Pulse: 66   Resp: 16   Temp: 98.4 °F (36.9 °C)   TempSrc: Oral   SpO2: 99%   Weight: 269 lb 1.6 oz (122.1 kg)   Height: 5' 2\" (1.575 m)     Physical Examination: General appearance - alert, well appearing, and in mild distress, crying  Ears - bilateral TM's and external ear canals normal  Nose - normal nontender sinuses, mucosal congestion and mucosal erythema  Mouth - mucous membranes moist, pharynx normal without lesions and erythematous  Neck - supple, no significant adenopathy  Chest - clear to auscultation, no wheezes, rales or rhonchi, symmetric air entry, decreased air entry noted RLL   Heart - normal rate, regular rhythm, normal S1, S2, no murmurs, rubs, clicks or gallops    Assessment/ Plan:   Sumner was seen today for cold symptoms. Diagnoses and all orders for this visit:    CAP (community acquired pneumonia)  -     cefdinir (OMNICEF) 300 mg capsule; Take 1 Cap by mouth two (2) times a day for 10 days. -     methylPREDNISolone, PF, (SOLU-MEDROL) 125 mg/2 mL solr; 2 mL by IntraVENous route once for 1 dose. -     METHYLPREDNISOLONE INJECTION (Qty 2)  -     THER/PROPH/DIAG INJECTION, SUBCUT/IM  -     fluconazole (DIFLUCAN) 150 mg tablet; Take 1 Tab by mouth daily for 1 day.  May repeat in 3 days if needed. Discussed and encouraged rest and clear fluids, if congestion is thick should avoid dairy. Recommended hot showers with steam and elevating head of bed. May use Vitamin C or Echinacea in addition to current therapy. Intractable migraine without aura and with status migrainosus  -     methylPREDNISolone, PF, (SOLU-MEDROL) 125 mg/2 mL solr; 2 mL by IntraVENous route once for 1 dose. -     METHYLPREDNISOLONE INJECTION (Qty 2)  -     THER/PROPH/DIAG INJECTION, SUBCUT/IM  F/U prn    Cough  -     promethazine-codeine (PHENERGAN WITH CODEINE) 6.25-10 mg/5 mL syrup; Take 5 mL by mouth every six (6) hours as needed for Cough. Max Daily Amount: 20 mL. New rx. Pt voiced understanding regarding plan of care. Follow-up Disposition:  Return if symptoms worsen or fail to improve. I have discussed the diagnosis with the patient and the intended plan as seen in the above orders. The patient has received an after-visit summary and questions were answered concerning future plans.      Medication Side Effects and Warnings were discussed with patient    Madhu Hopkins NP

## 2017-03-15 NOTE — PROGRESS NOTES
Chief Complaint   Patient presents with    Cold Symptoms     Headche, Congestion, Sinus Pressure, Dizzy X3 days

## 2017-03-16 ENCOUNTER — DOCUMENTATION ONLY (OUTPATIENT)
Dept: FAMILY MEDICINE CLINIC | Age: 47
End: 2017-03-16

## 2017-03-16 NOTE — PROGRESS NOTES
Please call pt and confirm days requested, note provided by this office was for 2/21-2/27 returning on 2/28. Pt's paperwork is requesting time off from 2/21-3/6. Please advise pt that paperwork will only be completed to cover days approved (2/21-2/27)    Side note- work note written today by REFUGIO Marcelo for 3/15-3/19.

## 2017-03-22 ENCOUNTER — DOCUMENTATION ONLY (OUTPATIENT)
Dept: FAMILY MEDICINE CLINIC | Age: 47
End: 2017-03-22

## 2017-03-23 ENCOUNTER — HOSPITAL ENCOUNTER (EMERGENCY)
Age: 47
Discharge: HOME | End: 2017-03-23
Attending: EMERGENCY MEDICINE
Payer: COMMERCIAL

## 2017-03-23 ENCOUNTER — APPOINTMENT (OUTPATIENT)
Dept: GENERAL RADIOLOGY | Age: 47
End: 2017-03-23
Attending: PHYSICIAN ASSISTANT
Payer: COMMERCIAL

## 2017-03-23 VITALS
BODY MASS INDEX: 49.32 KG/M2 | WEIGHT: 268 LBS | HEART RATE: 65 BPM | TEMPERATURE: 98.1 F | HEIGHT: 62 IN | SYSTOLIC BLOOD PRESSURE: 124 MMHG | OXYGEN SATURATION: 100 % | DIASTOLIC BLOOD PRESSURE: 62 MMHG | RESPIRATION RATE: 16 BRPM

## 2017-03-23 DIAGNOSIS — J06.9 UPPER RESPIRATORY TRACT INFECTION, UNSPECIFIED TYPE: Primary | ICD-10-CM

## 2017-03-23 LAB
ANION GAP BLD CALC-SCNC: 10 MMOL/L (ref 5–15)
BASOPHILS # BLD AUTO: 0 K/UL (ref 0–0.1)
BASOPHILS # BLD: 0 % (ref 0–1)
BUN SERPL-MCNC: 9 MG/DL (ref 6–20)
BUN/CREAT SERPL: 9 (ref 12–20)
CALCIUM SERPL-MCNC: 8.2 MG/DL (ref 8.5–10.1)
CHLORIDE SERPL-SCNC: 102 MMOL/L (ref 97–108)
CO2 SERPL-SCNC: 28 MMOL/L (ref 21–32)
CREAT SERPL-MCNC: 0.95 MG/DL (ref 0.55–1.02)
D DIMER PPP FEU-MCNC: 0.32 MG/L FEU (ref 0–0.65)
EOSINOPHIL # BLD: 0.2 K/UL (ref 0–0.4)
EOSINOPHIL NFR BLD: 2 % (ref 0–7)
ERYTHROCYTE [DISTWIDTH] IN BLOOD BY AUTOMATED COUNT: 14.9 % (ref 11.5–14.5)
GLUCOSE SERPL-MCNC: 107 MG/DL (ref 65–100)
HCT VFR BLD AUTO: 38.4 % (ref 35–47)
HGB BLD-MCNC: 13.6 G/DL (ref 11.5–16)
LYMPHOCYTES # BLD AUTO: 18 % (ref 12–49)
LYMPHOCYTES # BLD: 2.3 K/UL (ref 0.8–3.5)
MCH RBC QN AUTO: 28.1 PG (ref 26–34)
MCHC RBC AUTO-ENTMCNC: 35.4 G/DL (ref 30–36.5)
MCV RBC AUTO: 79.3 FL (ref 80–99)
MONOCYTES # BLD: 0.8 K/UL (ref 0–1)
MONOCYTES NFR BLD AUTO: 6 % (ref 5–13)
NEUTS SEG # BLD: 9.1 K/UL (ref 1.8–8)
NEUTS SEG NFR BLD AUTO: 74 % (ref 32–75)
PLATELET # BLD AUTO: 370 K/UL (ref 150–400)
POTASSIUM SERPL-SCNC: 2.9 MMOL/L (ref 3.5–5.1)
RBC # BLD AUTO: 4.84 M/UL (ref 3.8–5.2)
SODIUM SERPL-SCNC: 140 MMOL/L (ref 136–145)
TROPONIN I SERPL-MCNC: <0.04 NG/ML
WBC # BLD AUTO: 12.4 K/UL (ref 3.6–11)

## 2017-03-23 PROCEDURE — 71020 XR CHEST PA LAT: CPT

## 2017-03-23 PROCEDURE — 84484 ASSAY OF TROPONIN QUANT: CPT | Performed by: PHYSICIAN ASSISTANT

## 2017-03-23 PROCEDURE — 85379 FIBRIN DEGRADATION QUANT: CPT | Performed by: PHYSICIAN ASSISTANT

## 2017-03-23 PROCEDURE — 85025 COMPLETE CBC W/AUTO DIFF WBC: CPT | Performed by: PHYSICIAN ASSISTANT

## 2017-03-23 PROCEDURE — 93005 ELECTROCARDIOGRAM TRACING: CPT

## 2017-03-23 PROCEDURE — 80048 BASIC METABOLIC PNL TOTAL CA: CPT | Performed by: PHYSICIAN ASSISTANT

## 2017-03-23 PROCEDURE — 74011250637 HC RX REV CODE- 250/637: Performed by: PHYSICIAN ASSISTANT

## 2017-03-23 PROCEDURE — 99283 EMERGENCY DEPT VISIT LOW MDM: CPT

## 2017-03-23 PROCEDURE — 36415 COLL VENOUS BLD VENIPUNCTURE: CPT | Performed by: PHYSICIAN ASSISTANT

## 2017-03-23 RX ORDER — POTASSIUM CHLORIDE 750 MG/1
40 TABLET, FILM COATED, EXTENDED RELEASE ORAL
Status: COMPLETED | OUTPATIENT
Start: 2017-03-23 | End: 2017-03-23

## 2017-03-23 RX ADMIN — POTASSIUM CHLORIDE 40 MEQ: 750 TABLET, FILM COATED, EXTENDED RELEASE ORAL at 19:15

## 2017-03-23 NOTE — DISCHARGE INSTRUCTIONS
Upper Respiratory Infection (Cold): Care Instructions  Your Care Instructions    An upper respiratory infection, or URI, is an infection of the nose, sinuses, or throat. URIs are spread by coughs, sneezes, and direct contact. The common cold is the most frequent kind of URI. The flu and sinus infections are other kinds of URIs. Almost all URIs are caused by viruses. Antibiotics won't cure them. But you can treat most infections with home care. This may include drinking lots of fluids and taking over-the-counter pain medicine. You will probably feel better in 4 to 10 days. The doctor has checked you carefully, but problems can develop later. If you notice any problems or new symptoms, get medical treatment right away. Follow-up care is a key part of your treatment and safety. Be sure to make and go to all appointments, and call your doctor if you are having problems. It's also a good idea to know your test results and keep a list of the medicines you take. How can you care for yourself at home? · To prevent dehydration, drink plenty of fluids, enough so that your urine is light yellow or clear like water. Choose water and other caffeine-free clear liquids until you feel better. If you have kidney, heart, or liver disease and have to limit fluids, talk with your doctor before you increase the amount of fluids you drink. · Take an over-the-counter pain medicine, such as acetaminophen (Tylenol), ibuprofen (Advil, Motrin), or naproxen (Aleve). Read and follow all instructions on the label. · Before you use cough and cold medicines, check the label. These medicines may not be safe for young children or for people with certain health problems. · Be careful when taking over-the-counter cold or flu medicines and Tylenol at the same time. Many of these medicines have acetaminophen, which is Tylenol. Read the labels to make sure that you are not taking more than the recommended dose.  Too much acetaminophen (Tylenol) can be harmful. · Get plenty of rest.  · Do not smoke or allow others to smoke around you. If you need help quitting, talk to your doctor about stop-smoking programs and medicines. These can increase your chances of quitting for good. When should you call for help? Call 911 anytime you think you may need emergency care. For example, call if:  · You have severe trouble breathing. Call your doctor now or seek immediate medical care if:  · You seem to be getting much sicker. · You have new or worse trouble breathing. · You have a new or higher fever. · You have a new rash. Watch closely for changes in your health, and be sure to contact your doctor if:  · You have a new symptom, such as a sore throat, an earache, or sinus pain. · You cough more deeply or more often, especially if you notice more mucus or a change in the color of your mucus. · You do not get better as expected. Where can you learn more? Go to http://horacio-ary.info/. Enter W646 in the search box to learn more about \"Upper Respiratory Infection (Cold): Care Instructions. \"  Current as of: June 30, 2016  Content Version: 11.1  © 4525-9437 [employer]. Care instructions adapted under license by [employer] (which disclaims liability or warranty for this information). If you have questions about a medical condition or this instruction, always ask your healthcare professional. [place] [address] any warranty or liability for your use of this information. We hope that we have addressed all of your medical concerns. The examination and treatment you received in the Emergency Department were for an emergent problem and were not intended as complete care. It is important that you follow up with your healthcare provider(s) for ongoing care.  If your symptoms worsen or do not improve as expected, and you are unable to reach your usual health care provider(s), you should return to the Emergency Department. Today's healthcare is undergoing tremendous change, and patient satisfaction surveys are one of the many tools to assess the quality of medical care. You may receive a survey from the [employer] regarding your experience in the Emergency Department. I hope that your experience has been completely positive, particularly the medical care that I provided. As such, please participate in the survey; anything less than excellent does not meet my expectations or intentions. [facility] and [address] participate in nationally recognized quality of care measures. If your blood pressure is greater than 120/80, as reported below, we urge that you seek medical care to address the potential of high blood pressure, commonly known as hypertension. Hypertension can be hereditary or can be caused by certain medical conditions, pain, stress, or \"white coat syndrome. \"       Please make an appointment with your health care provider(s) for follow up of your Emergency Department visit. VITALS:   Patient Vitals for the past 8 hrs:   Temp Pulse Resp BP SpO2   03/23/17 1646 98.1 °F (36.7 °C) 74 18 127/66 100 %          Thank you for allowing us to provide you with medical care today. We realize that you have many choices for your emergency care needs. Please choose us in the future for any continued health care needs. [facility]  Basia Weber, [address] [facility]: [phone]            Recent Results (from the past 24 hour(s))   EKG, 12 LEAD, INITIAL    Collection Time: 03/23/17  5:35 PM   Result Value Ref Range    Ventricular Rate 68 BPM    Atrial Rate 68 BPM    P-R Interval 162 ms    QRS Duration 86 ms    Q-T Interval 458 ms    QTC Calculation (Bezet) 487 ms    Calculated P Axis 36 degrees    Calculated R Axis -17 degrees    Calculated T Axis 14 degrees    Diagnosis Normal sinus rhythm  Prolonged QT  Abnormal ECG  When compared with ECG of 06-DEC-2012 18:35,  No significant change was found     TROPONIN I    Collection Time: 03/23/17  5:50 PM   Result Value Ref Range    Troponin-I, Qt. <0.04 <0.05 ng/mL   CBC WITH AUTOMATED DIFF    Collection Time: 03/23/17  5:50 PM   Result Value Ref Range    WBC 12.4 (H) 3.6 - 11.0 K/uL    RBC 4.84 3.80 - 5.20 M/uL    HGB 13.6 11.5 - 16.0 g/dL    HCT 38.4 35.0 - 47.0 %    MCV 79.3 (L) 80.0 - 99.0 FL    MCH 28.1 26.0 - 34.0 PG    MCHC 35.4 30.0 - 36.5 g/dL    RDW 14.9 (H) 11.5 - 14.5 %    PLATELET 022 614 - 899 K/uL    NEUTROPHILS 74 32 - 75 %    LYMPHOCYTES 18 12 - 49 %    MONOCYTES 6 5 - 13 %    EOSINOPHILS 2 0 - 7 %    BASOPHILS 0 0 - 1 %    ABS. NEUTROPHILS 9.1 (H) 1.8 - 8.0 K/UL    ABS. LYMPHOCYTES 2.3 0.8 - 3.5 K/UL    ABS. MONOCYTES 0.8 0.0 - 1.0 K/UL    ABS. EOSINOPHILS 0.2 0.0 - 0.4 K/UL    ABS. BASOPHILS 0.0 0.0 - 0.1 K/UL   METABOLIC PANEL, BASIC    Collection Time: 03/23/17  5:50 PM   Result Value Ref Range    Sodium 140 136 - 145 mmol/L    Potassium 2.9 (L) 3.5 - 5.1 mmol/L    Chloride 102 97 - 108 mmol/L    CO2 28 21 - 32 mmol/L    Anion gap 10 5 - 15 mmol/L    Glucose 107 (H) 65 - 100 mg/dL    BUN 9 6 - 20 MG/DL    Creatinine 0.95 0.55 - 1.02 MG/DL    BUN/Creatinine ratio 9 (L) 12 - 20      GFR est AA >60 >60 ml/min/1.73m2    GFR est non-AA >60 >60 ml/min/1.73m2    Calcium 8.2 (L) 8.5 - 10.1 MG/DL   D DIMER    Collection Time: 03/23/17  5:50 PM   Result Value Ref Range    D-dimer 0.32 0.00 - 0.65 mg/L FEU       Xr Chest Pa Lat    Result Date: 3/23/2017  Exam:  2 view chest Indication: Cough, chest pressure Comparison to 12/6/2012. PA and lateral views demonstrate normal heart size. There is no acute process in the lung fields. The osseous structures are unremarkable.      Impression: No acute process or change compared to the prior exam.

## 2017-03-23 NOTE — ED NOTES
I have reviewed discharge instructions. Paperwork given by provider and reviewed with patient; opportunity for questions given. Pt confirmed understanding of discharge instructions and of need for follow up with primary care provider. Pt left walking . Pt left with all personal belongings. Pt is alert and oriented x 4. Pt is medically stable and is in no current distress.     Chief complaint was addressed

## 2017-03-23 NOTE — ED PROVIDER NOTES
HPI Comments: 52 y.o. female with past medical history significant for asthma, diverticulitis, and HTN presents with complaints of cough and body aches. The pt states \"I have had this cough since February. The pt was seen by her PCP on 3/15/17 and was dx with CAP and started on omnicef. She has not finished her abx at this point. She states that she has continued to have cough and body aches. Since arriving to the ED the pt states that she has developed chest pressure and right leg tightness. There are no other acute medical complaints at this time. Denies fever, chills, HA, dizziness, light headedness, dyspnea, chest pain, abdominal pain, N/V/D, melena, hematochezia, loss of bowel/bladder functioning, saddle anesthesia, urinary symptoms or any other acute medical conditions. PCP: MD Negra Alva PA-C      Patient is a 52 y.o. female presenting with cough and back pain. Cough   Pertinent negatives include no chest pain, no eye redness, no ear pain, no rhinorrhea, no sore throat, no myalgias, no shortness of breath, no wheezing, no nausea and no vomiting. Back Pain    Pertinent negatives include no chest pain, no fever, no numbness, no abdominal pain and no dysuria. Past Medical History:   Diagnosis Date    Asthma     Diverticulitis     Gastrointestinal disorder     reflux, IBS    Hypertension     Seasonal allergic rhinitis        Past Surgical History:   Procedure Laterality Date    ABDOMEN SURGERY PROC UNLISTED      HX ORTHOPAEDIC           Family History:   Problem Relation Age of Onset    Family history unknown: Yes       Social History     Social History    Marital status: SINGLE     Spouse name: N/A    Number of children: N/A    Years of education: N/A     Occupational History    Not on file.      Social History Main Topics    Smoking status: Never Smoker    Smokeless tobacco: Never Used    Alcohol use 0.0 oz/week     0 Standard drinks or equivalent per week      Comment: social    Drug use: Not on file    Sexual activity: Not on file     Other Topics Concern    Not on file     Social History Narrative         ALLERGIES: Chocolate flavor [flavoring agent]; Hazelnut [tree nut]; Horse chestnut; Other medication; Peanut; Sunflower oil; Amlodipine; Celebrex [celecoxib]; Clindamycin; Doxycycline; Flagyl [metronidazole]; Hydrocodone; Levofloxacin; Lortab [hydrocodone-acetaminophen]; Pcn [penicillins]; Prednisone; Sesame oil; and Shellfish containing products    Review of Systems   Constitutional: Negative for activity change, appetite change, diaphoresis and fever. HENT: Negative for ear discharge, ear pain, facial swelling, rhinorrhea, sore throat, tinnitus, trouble swallowing and voice change. Eyes: Negative for photophobia, pain, discharge, redness and visual disturbance. Respiratory: Positive for cough. Negative for chest tightness, shortness of breath, wheezing and stridor. Cardiovascular: Negative for chest pain and palpitations. Gastrointestinal: Negative for abdominal pain, constipation, diarrhea, nausea and vomiting. Endocrine: Negative for polydipsia and polyuria. Genitourinary: Negative for dysuria, flank pain and hematuria. Musculoskeletal: Positive for back pain. Negative for arthralgias and myalgias. Skin: Negative for color change and rash. Neurological: Negative for dizziness, syncope, speech difficulty, light-headedness and numbness. Psychiatric/Behavioral: Negative for behavioral problems. Vitals:    03/23/17 1646   BP: 127/66   Pulse: 74   Resp: 18   Temp: 98.1 °F (36.7 °C)   SpO2: 100%   Weight: 121.6 kg (268 lb)   Height: 5' 2\" (1.575 m)            Physical Exam   Constitutional: She is oriented to person, place, and time. She appears well-developed and well-nourished. HENT:   Head: Normocephalic and atraumatic. Eyes: Conjunctivae are normal. Pupils are equal, round, and reactive to light.  Right eye exhibits no discharge. Left eye exhibits no discharge. Neck: Normal range of motion. Neck supple. No thyromegaly present. Cardiovascular: Normal rate, regular rhythm and normal heart sounds. Exam reveals no gallop and no friction rub. No murmur heard. Pulmonary/Chest: Effort normal and breath sounds normal. No respiratory distress. She has no wheezes. Abdominal: Soft. Bowel sounds are normal. She exhibits no distension. There is no tenderness. There is no rebound and no guarding. Neurological: She is alert and oriented to person, place, and time. Skin: Skin is warm. Psychiatric: She has a normal mood and affect. MDM  Number of Diagnoses or Management Options  Upper respiratory tract infection, unspecified type:   Diagnosis management comments: Pt presents with cough body aches and chest pressure. Considered MI, PE and dissection. Trop and D-dimer negative. Blood pressure, vitals were unremarkable. Also considered PNA. CXR was negative and pt afebrile. Sx likely due to viral URI. Will d/c home and advise follow up with family doctor for further evaluation of symptoms. Reviewed treatment plan with attending and they agree.   Juan Briscoe PA-C      ED Course       Procedures

## 2017-03-24 LAB
ATRIAL RATE: 68 BPM
CALCULATED P AXIS, ECG09: 36 DEGREES
CALCULATED R AXIS, ECG10: -17 DEGREES
CALCULATED T AXIS, ECG11: 14 DEGREES
DIAGNOSIS, 93000: NORMAL
P-R INTERVAL, ECG05: 162 MS
Q-T INTERVAL, ECG07: 458 MS
QRS DURATION, ECG06: 86 MS
QTC CALCULATION (BEZET), ECG08: 487 MS
VENTRICULAR RATE, ECG03: 68 BPM

## 2017-05-08 RX ORDER — ESOMEPRAZOLE MAGNESIUM 40 MG/1
CAPSULE, DELAYED RELEASE ORAL
Qty: 90 CAP | Refills: 0 | Status: SHIPPED | OUTPATIENT
Start: 2017-05-08 | End: 2017-12-11 | Stop reason: SDUPTHER

## 2017-05-15 RX ORDER — HYDROCHLOROTHIAZIDE 25 MG/1
TABLET ORAL
Qty: 30 TAB | Refills: 0 | Status: SHIPPED | OUTPATIENT
Start: 2017-05-15 | End: 2017-06-12 | Stop reason: SDUPTHER

## 2017-05-15 RX ORDER — CITALOPRAM 20 MG/1
TABLET, FILM COATED ORAL
Qty: 90 TAB | Refills: 0 | Status: SHIPPED | OUTPATIENT
Start: 2017-05-15 | End: 2017-06-12 | Stop reason: SDUPTHER

## 2017-05-30 NOTE — LETTER
3/15/2017 3:28 PM 
 
Ms. Miah Garcia 
[facility] [address] [facility] [id number] Please excuse Ms. Marlene Thomas from work 3/15/17 - 3/19/17 due to illness. Sincerely, Rodolfo Petersen NP 
 
 A 54-year-old established female patient.  She is in for knee shots today, but   she did report having some indigestion last week where she has had some slight   wheezing in her upper airway and she described it as a significant reflux   incident.    Suggested double dose of her Prilosec and increasing use of her asthma inhaler.    She will follow up in one week for her last knee shot.      IQRA  dd: 05/29/2017 17:09:43 (CDT)  td: 05/30/2017 07:58:08 (CDT)  Doc ID   #[id number]  Job ID #[id number]    CC:

## 2017-06-13 RX ORDER — HYDROCHLOROTHIAZIDE 25 MG/1
TABLET ORAL
Qty: 90 TAB | Refills: 1 | Status: SHIPPED | OUTPATIENT
Start: 2017-06-13 | End: 2018-02-25 | Stop reason: SDUPTHER

## 2017-06-13 RX ORDER — CITALOPRAM 20 MG/1
TABLET, FILM COATED ORAL
Qty: 90 TAB | Refills: 1 | Status: SHIPPED | OUTPATIENT
Start: 2017-06-13 | End: 2018-02-25 | Stop reason: SDUPTHER

## 2017-06-30 RX ORDER — EPINEPHRINE 0.3 MG/.3ML
0.3 INJECTION SUBCUTANEOUS
Qty: 1 SYRINGE | Refills: 1 | Status: SHIPPED | OUTPATIENT
Start: 2017-06-30 | End: 2018-07-25 | Stop reason: SDUPTHER

## 2017-08-11 ENCOUNTER — HOSPITAL ENCOUNTER (EMERGENCY)
Age: 47
Discharge: HOME | End: 2017-08-11
Attending: EMERGENCY MEDICINE
Payer: COMMERCIAL

## 2017-08-11 VITALS
HEIGHT: 62 IN | RESPIRATION RATE: 16 BRPM | BODY MASS INDEX: 49.13 KG/M2 | OXYGEN SATURATION: 99 % | TEMPERATURE: 98.3 F | WEIGHT: 267 LBS | HEART RATE: 77 BPM | DIASTOLIC BLOOD PRESSURE: 84 MMHG | SYSTOLIC BLOOD PRESSURE: 123 MMHG

## 2017-08-11 DIAGNOSIS — R51.9 NONINTRACTABLE HEADACHE, UNSPECIFIED CHRONICITY PATTERN, UNSPECIFIED HEADACHE TYPE: Primary | ICD-10-CM

## 2017-08-11 LAB
ALBUMIN SERPL BCP-MCNC: 3.2 G/DL (ref 3.5–5)
ALBUMIN/GLOB SERPL: 0.8 {RATIO} (ref 1.1–2.2)
ALP SERPL-CCNC: 71 U/L (ref 45–117)
ALT SERPL-CCNC: 16 U/L (ref 12–78)
ANION GAP BLD CALC-SCNC: 10 MMOL/L (ref 5–15)
AST SERPL W P-5'-P-CCNC: 10 U/L (ref 15–37)
BASOPHILS # BLD AUTO: 0 K/UL (ref 0–0.1)
BASOPHILS # BLD: 0 % (ref 0–1)
BILIRUB SERPL-MCNC: 0.3 MG/DL (ref 0.2–1)
BUN SERPL-MCNC: 13 MG/DL (ref 6–20)
BUN/CREAT SERPL: 15 (ref 12–20)
CALCIUM SERPL-MCNC: 8.6 MG/DL (ref 8.5–10.1)
CHLORIDE SERPL-SCNC: 106 MMOL/L (ref 97–108)
CO2 SERPL-SCNC: 23 MMOL/L (ref 21–32)
CREAT SERPL-MCNC: 0.89 MG/DL (ref 0.55–1.02)
EOSINOPHIL # BLD: 0.2 K/UL (ref 0–0.4)
EOSINOPHIL NFR BLD: 1 % (ref 0–7)
ERYTHROCYTE [DISTWIDTH] IN BLOOD BY AUTOMATED COUNT: 14.8 % (ref 11.5–14.5)
GLOBULIN SER CALC-MCNC: 4.1 G/DL (ref 2–4)
GLUCOSE SERPL-MCNC: 96 MG/DL (ref 65–100)
HCT VFR BLD AUTO: 41.1 % (ref 35–47)
HGB BLD-MCNC: 14.6 G/DL (ref 11.5–16)
LYMPHOCYTES # BLD AUTO: 19 % (ref 12–49)
LYMPHOCYTES # BLD: 2.5 K/UL (ref 0.8–3.5)
MCH RBC QN AUTO: 28.1 PG (ref 26–34)
MCHC RBC AUTO-ENTMCNC: 35.5 G/DL (ref 30–36.5)
MCV RBC AUTO: 79.2 FL (ref 80–99)
MONOCYTES # BLD: 0.8 K/UL (ref 0–1)
MONOCYTES NFR BLD AUTO: 6 % (ref 5–13)
NEUTS SEG # BLD: 9.5 K/UL (ref 1.8–8)
NEUTS SEG NFR BLD AUTO: 74 % (ref 32–75)
PLATELET # BLD AUTO: 332 K/UL (ref 150–400)
POTASSIUM SERPL-SCNC: 3.1 MMOL/L (ref 3.5–5.1)
PROT SERPL-MCNC: 7.3 G/DL (ref 6.4–8.2)
RBC # BLD AUTO: 5.19 M/UL (ref 3.8–5.2)
SODIUM SERPL-SCNC: 139 MMOL/L (ref 136–145)
WBC # BLD AUTO: 13 K/UL (ref 3.6–11)

## 2017-08-11 PROCEDURE — 99282 EMERGENCY DEPT VISIT SF MDM: CPT

## 2017-08-11 PROCEDURE — 99281 EMR DPT VST MAYX REQ PHY/QHP: CPT

## 2017-08-11 PROCEDURE — 85025 COMPLETE CBC W/AUTO DIFF WBC: CPT | Performed by: EMERGENCY MEDICINE

## 2017-08-11 PROCEDURE — 80053 COMPREHEN METABOLIC PANEL: CPT | Performed by: EMERGENCY MEDICINE

## 2017-08-11 NOTE — ED PROVIDER NOTES
HPI Comments: 52 y.o. female with past medical history significant for asthma, diverticulitis, HTN, acid reflux, and IBS who presents from home with chief complaint of HA. Pt states that she has had a migraine on the top of her head for the past 3 weeks. The HA has accompanying dizziness, nausea, abdominal pain, fingers tingling, poor appetite, balance problems, night sweats, nausea, fever, and blurred vision. She has been taking Topamax, Fioricet, and a pill that dissolves under her tongue for the HA. All three pills have been working but the HA returns afterwards. She has been seeing Dr Burkett(neurologist) for the past 2 months. Pt had a normal MRI and was told by Dr Kristina Simons that she could return to work yesterday but she does not think that she is able to. She tried to go to work yesterday and was sent home because her BP was elevated. She denies any vomiting. There are no other acute medical concerns at this time. Social hx: Drinks alcohol. Smokes tobacco  PCP: Roseann Fitzgerald MD    Note written by Ruthy Montes, as dictated by Deisi Camarillo MD 4:44 PM      The history is provided by the patient. No  was used. Past Medical History:   Diagnosis Date    Asthma     Diverticulitis     Gastrointestinal disorder     reflux, IBS    Hypertension     Seasonal allergic rhinitis        Past Surgical History:   Procedure Laterality Date    ABDOMEN SURGERY PROC UNLISTED      HX ORTHOPAEDIC           Family History:   Problem Relation Age of Onset    Family history unknown: Yes       Social History     Social History    Marital status: SINGLE     Spouse name: N/A    Number of children: N/A    Years of education: N/A     Occupational History    Not on file.      Social History Main Topics    Smoking status: Never Smoker    Smokeless tobacco: Never Used    Alcohol use 0.0 oz/week     0 Standard drinks or equivalent per week      Comment: social    Drug use: Not on file  Sexual activity: Not on file     Other Topics Concern    Not on file     Social History Narrative         ALLERGIES: Chocolate flavor [flavoring agent]; Hazelnut [tree nut]; Horse chestnut; Other medication; Peanut; Sunflower oil; Amlodipine; Celebrex [celecoxib]; Clindamycin; Doxycycline; Flagyl [metronidazole]; Hydrocodone; Levofloxacin; Lortab [hydrocodone-acetaminophen]; Pcn [penicillins]; Prednisone; Sesame oil; and Shellfish containing products    Review of Systems   Constitutional: Positive for diaphoresis and fever. Negative for activity change, appetite change and fatigue. HENT: Negative for ear pain, facial swelling, sore throat and trouble swallowing. Eyes: Negative for pain, discharge and visual disturbance. Respiratory: Negative for chest tightness, shortness of breath and wheezing. Cardiovascular: Negative for chest pain and palpitations. Gastrointestinal: Positive for abdominal pain and nausea. Negative for blood in stool and vomiting. Genitourinary: Negative for difficulty urinating, flank pain and hematuria. Musculoskeletal: Negative for arthralgias, joint swelling, myalgias and neck pain. Skin: Negative for color change and rash. Neurological: Positive for dizziness, numbness and headaches. Negative for weakness. Hematological: Negative for adenopathy. Does not bruise/bleed easily. Psychiatric/Behavioral: Negative for behavioral problems, confusion and sleep disturbance. All other systems reviewed and are negative. Vitals:    08/11/17 1550   BP: 123/84   Pulse: 77   Resp: 16   Temp: 98.3 °F (36.8 °C)   SpO2: 99%   Weight: 121.1 kg (267 lb)   Height: 5' 2\" (1.575 m)            Physical Exam   Constitutional: She is oriented to person, place, and time. She appears well-developed and well-nourished. No distress. HENT:   Head: Normocephalic and atraumatic.    Nose: Nose normal.   Mouth/Throat: Oropharynx is clear and moist.   Eyes: Conjunctivae and EOM are normal. Pupils are equal, round, and reactive to light. No scleral icterus. Neck: Normal range of motion. Neck supple. No JVD present. No tracheal deviation present. No thyromegaly present. No carotid bruits noted. Cardiovascular: Normal rate, regular rhythm, normal heart sounds and intact distal pulses. Exam reveals no gallop and no friction rub. No murmur heard. Pulmonary/Chest: Effort normal and breath sounds normal. No respiratory distress. She has no wheezes. She has no rales. She exhibits no tenderness. Abdominal: Soft. Bowel sounds are normal. She exhibits no distension and no mass. There is no tenderness. There is no rebound and no guarding. Musculoskeletal: Normal range of motion. She exhibits no edema or tenderness. Lymphadenopathy:     She has no cervical adenopathy. Neurological: She is alert and oriented to person, place, and time. She has normal reflexes. No cranial nerve deficit. Coordination normal.   Skin: Skin is warm and dry. No rash noted. No erythema. Psychiatric: She has a normal mood and affect. Her behavior is normal. Judgment and thought content normal.   Nursing note and vitals reviewed.    Note written by Ruthy Hicks, as dictated by Mario Noble MD 4:48 PM      MDM  Number of Diagnoses or Management Options  Nonintractable headache, unspecified chronicity pattern, unspecified headache type: established and worsening     Amount and/or Complexity of Data Reviewed  Clinical lab tests: ordered and reviewed  Decide to obtain previous medical records or to obtain history from someone other than the patient: yes  Review and summarize past medical records: yes  Discuss the patient with other providers: yes    Risk of Complications, Morbidity, and/or Mortality  Presenting problems: moderate  Diagnostic procedures: moderate  Management options: low    Patient Progress  Patient progress: stable    ED Course       Procedures    4:57 PM  Awaiting labs and call back from neurology. I have told the patient that she will be able to be seen probably next week by one of the  Neurologist.    She has Fioricet and Sumatryptin at home to take for the headaches. Has appointment with PCP on Monday of next week. Will encourage her to continue to take her regular medications and follow up as directed with PCP and neurologist.        CONSULT NOTE:  5:21 PM Consuelo Killian MD spoke with Dr. Paolo Luther, Consult for Neurology. Discussed available diagnostic tests and clinical findings.   He is in agreement with care plans as outlined and says she can f/u in the office

## 2017-08-11 NOTE — ED TRIAGE NOTES
\"I've had a migraine for 3 weeks. I have positional vertigo and wake up and go to bed with dizziness. I had a MRI of my brain on 7/31/17 and it was normal\".

## 2017-08-14 ENCOUNTER — OFFICE VISIT (OUTPATIENT)
Dept: FAMILY MEDICINE CLINIC | Age: 47
End: 2017-08-14

## 2017-08-14 VITALS
DIASTOLIC BLOOD PRESSURE: 99 MMHG | SYSTOLIC BLOOD PRESSURE: 156 MMHG | WEIGHT: 265.8 LBS | HEIGHT: 62 IN | OXYGEN SATURATION: 95 % | TEMPERATURE: 98.7 F | BODY MASS INDEX: 48.91 KG/M2 | RESPIRATION RATE: 21 BRPM | HEART RATE: 71 BPM

## 2017-08-14 DIAGNOSIS — G43.011 INTRACTABLE MIGRAINE WITHOUT AURA AND WITH STATUS MIGRAINOSUS: ICD-10-CM

## 2017-08-14 DIAGNOSIS — I10 ESSENTIAL HYPERTENSION: Primary | ICD-10-CM

## 2017-08-14 RX ORDER — LISINOPRIL 20 MG/1
20 TABLET ORAL DAILY
Qty: 30 TAB | Refills: 2 | Status: SHIPPED | OUTPATIENT
Start: 2017-08-14 | End: 2017-09-11

## 2017-08-14 RX ORDER — TIOTROPIUM BROMIDE INHALATION SPRAY 1.56 UG/1
SPRAY, METERED RESPIRATORY (INHALATION)
Refills: 6 | COMMUNITY
Start: 2017-06-30

## 2017-08-14 NOTE — MR AVS SNAPSHOT
Visit Information Date & Time Provider Department Dept. Phone Encounter #  
 8/14/2017  2:40 PM Deon Vyas MD [address] [facility] [phone] [id number] Upcoming Health Maintenance Date Due Pneumococcal 19-64 Medium Risk (1 of 1 - PPSV23) 3/2/1989 DTaP/Tdap/Td series (1 - Tdap) 3/2/1991 PAP AKA CERVICAL CYTOLOGY 3/2/1991 INFLUENZA AGE 9 TO ADULT 8/1/2017 Allergies as of 8/14/2017  Review Complete On: 8/14/2017 By: Deon Vyas MD  
  
 Severity Noted Reaction Type Reactions Chocolate Flavor [Flavoring Agent] High 09/12/2014    Anaphylaxis Hazelnut [Tree Nut] High 09/12/2014    Anaphylaxis [facility] 09/12/2014    Anaphylaxis Other Medication High 05/02/2012    Anaphylaxis NUTS Peanut High 09/12/2014    Anaphylaxis Sunflower Oil High 09/12/2014    Anaphylaxis Amlodipine  09/12/2014    Itching Eye swelling Celebrex [Celecoxib]  05/02/2012    Itching Clindamycin  05/02/2012    Itching Doxycycline  05/02/2012    Itching Flagyl [Metronidazole]  05/02/2012    Itching Hydrocodone  09/12/2014    Hives, Itching Eye swelling Levofloxacin  09/12/2014    Hives, Shortness of Breath, Itching Lortab [Hydrocodone-acetaminophen]  05/02/2012    Itching Pcn [Penicillins]  05/02/2012    Itching Prednisone  05/02/2012    Itching Sesame Oil  09/12/2014    Hives, Shortness of Breath, Itching Shellfish Containing Products  05/02/2012    Itching Current Immunizations  Reviewed on 7/16/2015 No immunizations on file. Not reviewed this visit You Were Diagnosed With   
  
 Codes Comments Essential hypertension    -  Primary ICD-10-CM: I10 
ICD-9-CM: 401.9 Intractable migraine without aura and with status migrainosus     ICD-10-CM: G43.011 
ICD-9-CM: 346.13 Vitals BP Pulse Temp Resp Height(growth percentile) Weight(growth percentile) (!) 156/99 (BP 1 Location: Left arm, BP Patient Position: Sitting) 71 98.7 °F (37.1 °C) (Oral) 21 5' 2\" (1.575 m) 265 lb 12.8 oz (120.6 kg) LMP SpO2 BMI OB Status Smoking Status 07/27/2017 95% 48.62 kg/m2 Having regular periods Never Smoker Vitals History BMI and BSA Data Body Mass Index Body Surface Area  
 48.62 kg/m 2 2.3 m 2 Preferred Pharmacy Pharmacy Name Phone [facility] [address] [place] [address] [phone] Your Updated Medication List  
  
   
This list is accurate as of: 8/14/17  4:24 PM.  Always use your most recent med list.  
  
  
  
  
 * ADVAIR DISKUS 250-50 mcg/dose diskus inhaler Generic drug:  fluticasone-salmeterol Take 1 Puff by inhalation every twelve (12) hours. * ADVAIR DISKUS 500-50 mcg/dose diskus inhaler Generic drug:  fluticasone-salmeterol * albuterol 5 mg/mL nebulizer solution Commonly known as:  PROVENTIL  
by Nebulization route every six (6) hours as needed. * PROAIR HFA 90 mcg/actuation inhaler Generic drug:  albuterol  
  
 calcium 500 mg Tab Take  by mouth two (2) times a day. citalopram 20 mg tablet Commonly known as:  CELEXA  
TAKE 1 TABLET BY MOUTH DAILY EPINEPHrine 0.3 mg/0.3 mL injection Commonly known as:  EPIPEN  
0.3 mL by IntraMUSCular route once as needed. ergocalciferol 50,000 unit capsule Commonly known as:  ERGOCALCIFEROL  
  
 esomeprazole 40 mg capsule Commonly known as:  NEXIUM  
TAKE 1 CAPSULE BY MOUTH DAILY ferrous sulfate 325 mg (65 mg iron) tablet Take  by mouth two (2) times a day. hydroCHLOROthiazide 25 mg tablet Commonly known as:  HYDRODIURIL  
TAKE 1 TABLET BY MOUTH DAILY  
  
 ibuprofen 800 mg tablet Commonly known as:  MOTRIN Take 1 Tab by mouth every eight (8) hours as needed for Pain. lisinopril 20 mg tablet Commonly known as:  Carleen Canas  
 Take 1 Tab by mouth daily. meclizine 12.5 mg tablet Commonly known as:  ANTIVERT Take 1 Tab by mouth three (3) times daily as needed for Dizziness or Nausea. potassium chloride SR 10 mEq tablet Commonly known as:  KLOR-CON 10  
TAKE 2 TABLETS BY MOUTH DAILY  
  
 SINGULAIR 10 mg tablet Generic drug:  montelukast  
Take 10 mg by mouth daily. SPIRIVA RESPIMAT 1.25 mcg/actuation inhaler Generic drug:  tiotropium bromide INL 2 PUFFS PO ONCE D  
  
 TOPAMAX 200 mg tablet Generic drug:  topiramate Take  by mouth two (2) times a day. XOLAIR 150 mg Solr Generic drug:  omalizumab  
  
 zolpidem 10 mg tablet Commonly known as:  AMBIEN Take 1 Tab by mouth nightly as needed for Sleep. Max Daily Amount: 10 mg.  
  
 * Notice: This list has 4 medication(s) that are the same as other medications prescribed for you. Read the directions carefully, and ask your doctor or other care provider to review them with you. Prescriptions Sent to Pharmacy Refills  
 lisinopril (PRINIVIL, ZESTRIL) 20 mg tablet 2 Sig: Take 1 Tab by mouth daily. Class: Normal  
 Pharmacy: [facility] [address] [place] [address] [address] [place] [address] #: [phone] Route: Oral  
  
We Performed the Following REFERRAL TO CARDIOLOGY [WYR93 Custom] Referral Information Referral ID Referred By Referred To  
  
 [id number] Anthony GARCIA MD   
   [address] [place] [address] Phone: [phone] Fax: [phone] Visits Status Start Date End Date 1 New Request 8/14/17 8/14/18 If your referral has a status of pending review or denied, additional information will be sent to support the outcome of this decision. Introducing [facility]!    
 Jero Triplett introduces [facility] patient portal. Now you can access parts of your medical record, email your doctor's office, and request medication refills online. 1. In your internet browser, go to [email or web address] (acquired by [email or web address]). [email or web address] (acquired by [employer]) 2. Click on the First Time User? Click Here link in the Sign In box. You will see the New Member Sign Up page. 3. Enter your [employer] Access Code exactly as it appears below. You will not need to use this code after youve completed the sign-up process. If you do not sign up before the expiration date, you must request a new code. · [employer] Access Code: [email or web address] Expires: 10/23/2017 12:32 PM 
 
4. Enter the last four digits of your Social Security Number (xxxx) and Date of Birth (mm/dd/yyyy) as indicated and click Submit. You will be taken to the next sign-up page. 5. Create a [employer] ID. This will be your [employer] login ID and cannot be changed, so think of one that is secure and easy to remember. 6. Create a [employer] password. You can change your password at any time. 7. Enter your Password Reset Question and Answer. This can be used at a later time if you forget your password. 8. Enter your e-mail address. You will receive e-mail notification when new information is available in [address] 9. Click Sign Up. You can now view and download portions of your medical record. 10. Click the Download Summary menu link to download a portable copy of your medical information. If you have questions, please visit the Frequently Asked Questions section of the [employer] website. Remember, [employer] is NOT to be used for urgent needs. For medical emergencies, dial 911. Now available from your iPhone and Android! Please provide this summary of care documentation to your next provider. Your primary care clinician is listed as Pete Fitzgerald. If you have any questions after today's visit, please call [phone].

## 2017-08-14 NOTE — PROGRESS NOTES
Chief Complaint   Patient presents with    Medication Refill     Ambien    Abdominal Pain    Dizziness    Medication Evaluation     Patient needs review of medication taken      Patient seen in the office today for multiple c/o. She states \"I have not been feeling right\". Patient reports multiple sx. . H/a, nausea, dizziness, stomach pain, fatigue, weakness, denies fever or chest pain  Patient reports difficulty to due her job due to her having to bend down every 8 minutes. Patient states her father had the very same sx, he was dx with \"main artery blockage 75% on one side and 100% on the other side\".

## 2017-08-14 NOTE — PROGRESS NOTES
Chief Complaint   Patient presents with    Medication Refill     Ambien    Abdominal Pain    Dizziness    Medication Evaluation     Patient needs review of medication taken      Patient seen in the office today for multiple c/o. She states \"I have not been feeling right\". Patient reports multiple sx. . H/a, nausea, dizziness, stomach pain, fatigue, weakness, denies fever or chest pain  Patient reports difficulty to due her job due to her having to bend down every 8 minutes. Patient states her father had the very same sx, he was dx with \"main artery blockage 75% on one side and 100% on the other side\". Pt reports that she has been working with [employer], reports that she has been told that her MRI was normal, no problem identified. Pt is upset that she was released to work. Pt reports that she is not feeling any better now than she was when she started treatment with him. Pt reports that she can not perform her job due to repeated bending which causes lightheadedness and dizziness per pt. Pt reports that her BP was elevated when she tried to return to work. Pt reports decreased appetite, feeling depressed, cant sleep. Pt reports that dizziness comes with headache, worse with increased activity. Subjective: (As above and below)     Chief Complaint   Patient presents with    Medication Refill     Ambien    Abdominal Pain    Dizziness    Medication Evaluation     Patient needs review of medication taken      she is a 52y.o. year old female who presents for evaluation. Reviewed PmHx, RxHx, FmHx, SocHx, AllgHx and updated in chart.     Review of Systems - negative except as listed above    Objective:     Vitals:    08/14/17 1507   BP: (!) 156/99   Pulse: 71   Resp: 21   Temp: 98.7 °F (37.1 °C)   TempSrc: Oral   SpO2: 95%   Weight: 265 lb 12.8 oz (120.6 kg)   Height: 5' 2\" (1.575 m)     Physical Examination: General appearance - oriented to person, place, and time  Mental status - anxious  Mouth - mucous membranes moist, pharynx normal without lesions  Chest - clear to auscultation, no wheezes, rales or rhonchi, symmetric air entry  Heart - normal rate, regular rhythm, normal S1, S2, no murmurs, rubs, clicks or gallops  Musculoskeletal - no joint tenderness, deformity or swelling    Assessment/ Plan:   1. Essential hypertension  -start on losartan HCTZ  -refer for cardiac eval due to persistent dizziness with activity  -high level concern for anxiety component to symptoms  - REFERRAL TO CARDIOLOGY    2. Intractable migraine without aura and with status migrainosus  -advised to follow up with neurology  -hopefully improved BP control will help migraines      Follow-up Disposition: 2 weeks for BP check  I have discussed the diagnosis with the patient and the intended plan as seen in the above orders. The patient has received an after-visit summary and questions were answered concerning future plans.      Medication Side Effects and Warnings were discussed with patient: yes  Patient Labs were reviewed: yes  Patient Past Records were reviewed:  yes    Bryanna Saldivar M.D.

## 2017-08-15 ENCOUNTER — TELEPHONE (OUTPATIENT)
Dept: FAMILY MEDICINE CLINIC | Age: 47
End: 2017-08-15

## 2017-08-15 NOTE — TELEPHONE ENCOUNTER
Call placed to patient, voice mail left to return call to the office. If patient should return the call please inform patient per provider she has 1 refill left.  Her next refill is 9/16

## 2017-08-15 NOTE — TELEPHONE ENCOUNTER
Pt calling and states was here yesterday for appt and she was not given her rx for her medication Ambien. Please call her back at [phone].

## 2017-08-17 ENCOUNTER — TELEPHONE (OUTPATIENT)
Dept: FAMILY MEDICINE CLINIC | Age: 47
End: 2017-08-17

## 2017-08-17 NOTE — TELEPHONE ENCOUNTER
[phone] left a VM for pt to advise per Meghna Delatorre, she is not filling out [facility] paperwork. Pt was released per  and we did not remove from work. [facility] can write a note to excuse pt from appointment this week, but will not fill out paperwork. Forms placed in blue folder on my desk to hold for pt.

## 2017-08-18 RX ORDER — ZOLPIDEM TARTRATE 10 MG/1
10 TABLET ORAL
Qty: 30 TAB | Refills: 2 | OUTPATIENT
Start: 2017-08-18 | End: 2019-04-02 | Stop reason: SDUPTHER

## 2017-08-18 NOTE — TELEPHONE ENCOUNTER
Pt returned Shantelle's call and notified that Dr Rebecca Freeman needs to fill out [facility] paperwork. Pt stated she gave Dr Rebecca Freeman the [facility] paperwork and will give them a call to find out why they sent it to her PCP.

## 2017-09-11 ENCOUNTER — OFFICE VISIT (OUTPATIENT)
Dept: CARDIOLOGY CLINIC | Age: 47
End: 2017-09-11

## 2017-09-11 VITALS
WEIGHT: 271 LBS | SYSTOLIC BLOOD PRESSURE: 122 MMHG | DIASTOLIC BLOOD PRESSURE: 88 MMHG | HEART RATE: 64 BPM | BODY MASS INDEX: 49.57 KG/M2

## 2017-09-11 DIAGNOSIS — G43.011 INTRACTABLE MIGRAINE WITHOUT AURA AND WITH STATUS MIGRAINOSUS: ICD-10-CM

## 2017-09-11 DIAGNOSIS — I10 ESSENTIAL HYPERTENSION: Primary | ICD-10-CM

## 2017-09-11 DIAGNOSIS — R42 VERTIGO: ICD-10-CM

## 2017-09-11 RX ORDER — BISMUTH SUBSALICYLATE 262 MG
1 TABLET,CHEWABLE ORAL DAILY
COMMUNITY

## 2017-09-11 RX ORDER — CODEINE PHOSPHATE AND GUAIFENESIN 10; 100 MG/5ML; MG/5ML
5 SOLUTION ORAL
COMMUNITY

## 2017-09-11 RX ORDER — IPRATROPIUM BROMIDE AND ALBUTEROL SULFATE 2.5; .5 MG/3ML; MG/3ML
3 SOLUTION RESPIRATORY (INHALATION)
COMMUNITY

## 2017-09-11 RX ORDER — AZELASTINE HYDROCHLORIDE, FLUTICASONE PROPIONATE 137; 50 UG/1; UG/1
SPRAY, METERED NASAL AS NEEDED
COMMUNITY
End: 2017-10-16

## 2017-09-11 RX ORDER — TRIAMCINOLONE ACETONIDE 5 MG/G
OINTMENT TOPICAL AS NEEDED
COMMUNITY

## 2017-09-11 RX ORDER — MOMETASONE FUROATE 50 UG/1
2 SPRAY, METERED NASAL AS NEEDED
COMMUNITY
End: 2017-10-16

## 2017-09-11 RX ORDER — ONDANSETRON 4 MG/1
4 TABLET, FILM COATED ORAL
COMMUNITY

## 2017-09-11 NOTE — PROGRESS NOTES
HPI:  Patient referred by PCP for cardiac eval. She has been having migraines and vertigo from end of  [employer] like things are constantly swimming around her at least once per day, often when bending her head down at a certain angle. Has to hold on to things to walk normally. No falls. Every day takes topamax for migraines. If sxs are really bad she takes meclizine. She has had two episodes of almost passing out at work after feeling dizzy. Occurred after standing up from bending for a while. Says her PCP referred her to Cards to make sure no cardiac issue is contributing. Sometimes has chest pain if she coughs and wheezes from her asthma. Also has SOB during this time. Has been having night sweats almost nightly for a couple months. Says she has lost 10 lbs during the last month and notes her appetitie is not good. Smokes 4-6 cigarettes per day. Occasionally drinks wine. Occasional marijuana use, which helps her migraines. Menstrual cycles are irregular. Going to see Gyn tomorrow. 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 Not able to exercise. Pt denies HTN, HLD, DM, h/o MI or CVA. Has had 3 procedures for endometriosis. Also had plate placed and then removed from RLE. Grandmother has diabetes, HTN, HLD. Dad had a stroke. Dad also has blockages in carotid arteries. Mom  of breast cancer.

## 2017-09-11 NOTE — PROGRESS NOTES
Visit Vitals    /88 (BP 1 Location: Right arm, BP Patient Position: Sitting)    Pulse 64    Wt 271 lb (122.9 kg)    BMI 49.57 kg/m2     Pt has no complaints/no cardiac concerns    Extended / Orthostatic Vitals:  Patient Position 2: Supine  BP 2: 110/82  Pulse 2: 62  BP 3: (!) 122/92  Pulse 3: 64

## 2017-09-11 NOTE — MR AVS SNAPSHOT
Visit Information Date & Time Provider Department Dept. Phone Encounter #  
 9/11/2017  1:40 PM Kurt Stockton MD [facility] Ismael Weinstein [phone] [id number] Your Appointments 9/22/2017 10:00 AM  
ECHO CARDIOGRAMS 2D with ECHO, STFRANCIS  
[facility] ([facility] SCHEDULING) Appt Note: echo at 10am carotid at 11a dr Shira Sagastume dx dizziness  [address] [place] [address] [address] [facility]  
[phone]  
  
   
 [address] [place] [address] [facility] [address]  
  
    
 9/22/2017 11:00 AM  
VASCULAR TEST with VASCULAR, JW  
[facility] ([facility] SCHEDULING) Appt Note: echo at 10am carotid at 11a dr Shira hayden dizziness  [address] [place] [address] [facility]  
[address] [place] [address] [address] Upcoming Health Maintenance Date Due Pneumococcal 19-64 Medium Risk (1 of 1 - PPSV23) 3/2/1989 DTaP/Tdap/Td series (1 - Tdap) 3/2/1991 PAP AKA CERVICAL CYTOLOGY 3/2/1991 INFLUENZA AGE 9 TO ADULT 8/1/2017 Allergies as of 9/11/2017  Review Complete On: 9/11/2017 By: Kurt Stockton MD  
  
 Severity Noted Reaction Type Reactions Chocolate Flavor [Flavoring Agent] High 09/12/2014    Anaphylaxis Hazelnut [Tree Nut] High 09/12/2014    Anaphylaxis [facility] 09/12/2014    Anaphylaxis Other Medication High 05/02/2012    Anaphylaxis NUTS Peanut High 09/12/2014    Anaphylaxis Sunflower Oil High 09/12/2014    Anaphylaxis Amlodipine  09/12/2014    Itching Eye swelling Celebrex [Celecoxib]  05/02/2012    Itching Clindamycin  05/02/2012    Itching Doxycycline  05/02/2012    Itching Flagyl [Metronidazole]  05/02/2012    Itching Hydrocodone  09/12/2014    Hives, Itching Eye swelling Levofloxacin  09/12/2014    Hives, Shortness of Breath, Itching Lortab [Hydrocodone-acetaminophen]  05/02/2012    Itching Mold  09/11/2017    Vertigo Pcn [Penicillins]  05/02/2012    Itching Prednisone  05/02/2012    Itching Sesame Oil  09/12/2014    Hives, Shortness of Breath, Itching Shellfish Containing Products  05/02/2012    Itching Current Immunizations  Reviewed on 7/16/2015 No immunizations on file. Not reviewed this visit You Were Diagnosed With   
  
 Codes Comments Essential hypertension    -  Primary ICD-10-CM: I10 
ICD-9-CM: 401.9 Vitals BP Pulse Weight(growth percentile) BMI OB Status Smoking Status 122/88 (BP 1 Location: Right arm, BP Patient Position: Sitting) 64 271 lb (122.9 kg) 49.57 kg/m2 Having regular periods Current Every Day Smoker Vitals History BMI and BSA Data Body Mass Index Body Surface Area  
 49.57 kg/m 2 2.32 m 2 Preferred Pharmacy Pharmacy Name Phone [facility] [address] [place] [address] [phone] Your Updated Medication List  
  
   
This list is accurate as of: 9/11/17  3:49 PM.  Always use your most recent med list.  
  
  
  
  
 * ADVAIR DISKUS 250-50 mcg/dose diskus inhaler Generic drug:  fluticasone-salmeterol Take 1 Puff by inhalation every twelve (12) hours. * ADVAIR DISKUS 500-50 mcg/dose diskus inhaler Generic drug:  fluticasone-salmeterol * albuterol 5 mg/mL nebulizer solution Commonly known as:  PROVENTIL  
by Nebulization route every six (6) hours as needed. * PROAIR HFA 90 mcg/actuation inhaler Generic drug:  albuterol  
as needed. albuterol-ipratropium 2.5 mg-0.5 mg/3 ml Nebu Commonly known as:  DUO-NEB  
3 mL by Nebulization route. azelastine-fluticasone 137-50 mcg/spray [place]  
by Nasal route as needed. calcium 500 mg Tab Take  by mouth two (2) times a day. citalopram 20 mg tablet Commonly known as:  Jeffry Birch TAKE 1 TABLET BY MOUTH DAILY EPINEPHrine 0.3 mg/0.3 mL injection Commonly known as:  EPIPEN  
0.3 mL by IntraMUSCular route once as needed. ergocalciferol 50,000 unit capsule Commonly known as:  ERGOCALCIFEROL  
daily. esomeprazole 40 mg capsule Commonly known as:  NEXIUM  
TAKE 1 CAPSULE BY MOUTH DAILY ferrous sulfate 325 mg (65 mg iron) tablet Take  by mouth two (2) times a day. guaiFENesin-codeine 100-10 mg/5 mL solution Commonly known as:  ROBITUSSIN AC Take 5 mL by mouth three (3) times daily as needed for Cough. hydroCHLOROthiazide 25 mg tablet Commonly known as:  HYDRODIURIL  
TAKE 1 TABLET BY MOUTH DAILY  
  
 ibuprofen 800 mg tablet Commonly known as:  MOTRIN Take 1 Tab by mouth every eight (8) hours as needed for Pain. meclizine 12.5 mg tablet Commonly known as:  ANTIVERT Take 1 Tab by mouth three (3) times daily as needed for Dizziness or Nausea. mometasone 50 mcg/actuation nasal spray Commonly known as:  NASONEX  
2 Sprays as needed. multivitamin tablet Commonly known as:  ONE A DAY Take 1 Tab by mouth daily. potassium chloride SR 10 mEq tablet Commonly known as:  KLOR-CON 10  
TAKE 2 TABLETS BY MOUTH DAILY  
  
 SINGULAIR 10 mg tablet Generic drug:  montelukast  
Take 10 mg by mouth daily. SPIRIVA RESPIMAT 1.25 mcg/actuation inhaler Generic drug:  tiotropium bromide INL 2 PUFFS PO ONCE D  
  
 TOPAMAX 200 mg tablet Generic drug:  topiramate Take 50 mg by mouth two (2) times a day. Indications: 2 tabs every morning 1 tab every night  
  
 triamcinolone acetonide 0.5 % ointment Commonly known as:  KENALOG Apply  to affected area as needed for Skin Irritation. use thin layer XOLAIR 150 mg Solr Generic drug:  omalizumab  
every thirty (30) days. ZOFRAN (AS HYDROCHLORIDE) 4 mg tablet Generic drug:  ondansetron hcl Take 4 mg by mouth every eight (8) hours as needed for Nausea. zolpidem 10 mg tablet Commonly known as:  AMBIEN Take 1 Tab by mouth nightly as needed for Sleep. Max Daily Amount: 10 mg.  
  
 * Notice: This list has 4 medication(s) that are the same as other medications prescribed for you. Read the directions carefully, and ask your doctor or other care provider to review them with you. We Performed the Following AMB POC EKG ROUTINE W/ 12 LEADS, INTER & REP [23576 CPT(R)] Introducing [facility]! Hernandez Jeovanny introduces [employer] patient portal. Now you can access parts of your medical record, email your doctor's office, and request medication refills online. 1. In your internet browser, go to [email or web address] 2. Click on the First Time User? Click Here link in the Sign In box. You will see the New Member Sign Up page. 3. Enter your [employer] Access Code exactly as it appears below. You will not need to use this code after youve completed the sign-up process. If you do not sign up before the expiration date, you must request a new code. · [employer] Access Code: [email or web address] Expires: 10/23/2017 12:32 PM 
 
4. Enter the last four digits of your Social Security Number (xxxx) and Date of Birth (mm/dd/yyyy) as indicated and click Submit. You will be taken to the next sign-up page. 5. Create a [employer] ID. This will be your [employer] login ID and cannot be changed, so think of one that is secure and easy to remember. 6. Create a [employer] password. You can change your password at any time. 7. Enter your Password Reset Question and Answer. This can be used at a later time if you forget your password. 8. Enter your e-mail address. You will receive e-mail notification when new information is available in [address] 9. Click Sign Up. You can now view and download portions of your medical record. 10. Click the Download Summary menu link to download a portable copy of your medical information. If you have questions, please visit the Frequently Asked Questions section of the [employer] website. Remember, [employer] is NOT to be used for urgent needs. For medical emergencies, dial 911. Now available from your iPhone and Android! Please provide this summary of care documentation to your next provider. Your primary care clinician is listed as Pete Fitzgerald. If you have any questions after today's visit, please call [phone].

## 2017-09-11 NOTE — PROGRESS NOTES
Cira Nance MD. [facility]              Patient: Chris Alcala  : 1970      Today's Date: 2017            HISTORY OF PRESENT ILLNESS:     History of Present Illness:  Ms. Colleen Harrington was referred for HTN. Ms. Colleen Harrington says she has been having migraines and vertigo. She says problems are from her job due to mold at work. Room spins with vertigo (worse bending down) - stays for a while - can be constant for several days. Usually OK walking and standing. BP high only with migraines. Takes HCZT for edema. Dr. Reena Prince note 17 - stated \"She states \"I have not been feeling right\". Patient reports multiple sx. . H/a, nausea, dizziness, stomach pain, fatigue, weakness, denies fever or chest pain Patient reports difficulty to due her job due to her having to bend down every 8 minutes. Patient states her father had the very same sx, he was dx with \"main artery blockage 75% on one side and 100% on the other side\". Pt reports that she has been working with [employer], reports that she has been told that her MRI was normal, no problem identified. Pt is upset that she was released to work. Pt reports that she is not feeling any better now than she was when she started treatment with him. Pt reports that she can not perform her job due to repeated bending which causes lightheadedness and dizziness per pt. Pt reports that her BP was elevated when she tried to return to work. Pt reports decreased appetite, feeling depressed, cant sleep. Pt reports that dizziness comes with headache, worse with increased activity. \"              PAST MEDICAL HISTORY:     Past Medical History:   Diagnosis Date    Asthma     BPPV (benign paroxysmal positional vertigo)     Diverticulitis     Endometriosis     Gastrointestinal disorder     reflux, IBS    Hypertension     BP us with migraines     Migraines     Obesity     ANY on CPAP     Seasonal allergic rhinitis     Vertigo          Past Surgical History:   Procedure Laterality Date    ABDOMEN SURGERY PROC UNLISTED      HX ORTHOPAEDIC             MEDICATIONS:     Current Outpatient Prescriptions   Medication Sig Dispense Refill    albuterol-ipratropium (DUO-NEB) 2.5 mg-0.5 mg/3 ml nebu 3 mL by Nebulization route.  azelastine-fluticasone 137-50 mcg/spray spry by Nasal route as needed.  guaiFENesin-codeine (ROBITUSSIN AC) 100-10 mg/5 mL solution Take 5 mL by mouth three (3) times daily as needed for Cough.  mometasone (NASONEX) 50 mcg/actuation nasal spray 2 Sprays as needed.  ondansetron hcl (ZOFRAN, AS HYDROCHLORIDE,) 4 mg tablet Take 4 mg by mouth every eight (8) hours as needed for Nausea.  triamcinolone acetonide (KENALOG) 0.5 % ointment Apply  to affected area as needed for Skin Irritation. use thin layer      multivitamin (ONE A DAY) tablet Take 1 Tab by mouth daily.  zolpidem (AMBIEN) 10 mg tablet Take 1 Tab by mouth nightly as needed for Sleep. Max Daily Amount: 10 mg. 30 Tab 2    SPIRIVA RESPIMAT 1.25 mcg/actuation inhaler INL 2 PUFFS PO ONCE D  6    EPINEPHrine (EPIPEN) 0.3 mg/0.3 mL injection 0.3 mL by IntraMUSCular route once as needed. 1 Syringe 1    hydroCHLOROthiazide (HYDRODIURIL) 25 mg tablet TAKE 1 TABLET BY MOUTH DAILY 90 Tab 1    citalopram (CELEXA) 20 mg tablet TAKE 1 TABLET BY MOUTH DAILY 90 Tab 1    esomeprazole (NEXIUM) 40 mg capsule TAKE 1 CAPSULE BY MOUTH DAILY 90 Cap 0    ADVAIR DISKUS 500-50 mcg/dose diskus inhaler       potassium chloride SR (KLOR-CON 10) 10 mEq tablet TAKE 2 TABLETS BY MOUTH DAILY 180 Tab 1    topiramate (TOPAMAX) 200 mg tablet Take 50 mg by mouth two (2) times a day. Indications: 2 tabs every morning 1 tab every night      meclizine (ANTIVERT) 12.5 mg tablet Take 1 Tab by mouth three (3) times daily as needed for Dizziness or Nausea. 60 Tab 1    ibuprofen (MOTRIN) 800 mg tablet Take 1 Tab by mouth every eight (8) hours as needed for Pain.  30 Tab 1    PROAIR HFA 90 mcg/actuation inhaler as needed. 3    ergocalciferol (ERGOCALCIFEROL) 50,000 unit capsule daily.  XOLAIR 150 mg solr solution every thirty (30) days.  montelukast (SINGULAIR) 10 mg tablet Take 10 mg by mouth daily.  fluticasone-salmeterol (ADVAIR DISKUS) 250-50 mcg/dose diskus inhaler Take 1 Puff by inhalation every twelve (12) hours.  calcium 500 mg Tab Take  by mouth two (2) times a day.  ferrous sulfate 325 mg (65 mg iron) tablet Take  by mouth two (2) times a day.  albuterol (PROVENTIL) 5 mg/mL nebulizer solution by Nebulization route every six (6) hours as needed. Allergies   Allergen Reactions    Chocolate Flavor [Flavoring Agent] Anaphylaxis    Hazelnut [employer] Nut] Anaphylaxis    [place] Anaphylaxis    Other Medication Anaphylaxis     NUTS    Peanut Anaphylaxis    Sunflower Oil Anaphylaxis    Amlodipine Itching     Eye swelling    Celebrex [Celecoxib] Itching    Clindamycin Itching    Doxycycline Itching    Flagyl [Metronidazole] Itching    Hydrocodone Hives and Itching     Eye swelling    Levofloxacin Hives, Shortness of Breath and Itching    Lortab [Hydrocodone-Acetaminophen] Itching    Mold Vertigo    Pcn [Penicillins] Itching    Prednisone Itching    Sesame Oil Hives, Shortness of Breath and Itching    Shellfish Containing Products Itching             SOCIAL HISTORY:     Social History   Substance Use Topics    Smoking status: Current Every Day Smoker     Packs/day: 0.25    Smokeless tobacco: Never Used    Alcohol use 0.0 oz/week     0 Standard drinks or equivalent per week      Comment: social           FAMILY HISTORY:     Family History   Problem Relation Age of Onset    Stroke Father              REVIEW OF SYMPTOMS:     Review of Symptoms:  Constitutional: Negative for fever, chills  HEENT: Negative for nosebleeds, tinnitus, and vision changes.    Respiratory: Negative for cough, wheezing  Cardiovascular: Negative for orthopnea, claudication, syncope, and PND. Gastrointestinal: Negative for abdominal pain, diarrhea, melena. Genitourinary: Negative for dysuria  Musculoskeletal: Negative for myalgias. Skin: Negative for rash  Heme: No problems bleeding. Neurological: Negative for speech change and focal weakness. PHYSICAL EXAM:     Physical Exam:  Visit Vitals    /88 (BP 1 Location: Right arm, BP Patient Position: Sitting)    Pulse 64    Wt 271 lb (122.9 kg)    BMI 49.57 kg/m2     Patient appears generally well, mood and affect are appropriate and pleasant. HEENT:  Hearing intact, non-icteric, normocephalic, atraumatic. Neck Exam: Supple, No JVD or carotid bruits. Lung Exam: Clear to auscultation, even breath sounds. Cardiac Exam: Regular rate and rhythm with no murmur  Abdomen: Soft, non-tender, normal bowel sounds. No bruits or masses. Extremities: Moves all ext well. No lower extremity edema. Vascular: 2+ dorsalis pedis pulses bilaterally. Psych: Appropriate affect  Neuro - Grossly intact      Extended / Orthostatic Vitals:  Patient Position 2: Supine  BP 2: 110/82  Pulse 2: 62  BP 3: (!) 122/92 standing   Pulse 3: 64        LABS / OTHER STUDIES:       Lab Results   Component Value Date/Time    Sodium 139 08/11/2017 04:34 PM    Potassium 3.1 08/11/2017 04:34 PM    Chloride 106 08/11/2017 04:34 PM    CO2 23 08/11/2017 04:34 PM    Anion gap 10 08/11/2017 04:34 PM    Glucose 96 08/11/2017 04:34 PM    BUN 13 08/11/2017 04:34 PM    Creatinine 0.89 08/11/2017 04:34 PM    BUN/Creatinine ratio 15 08/11/2017 04:34 PM    GFR est AA >60 08/11/2017 04:34 PM    GFR est non-AA >60 08/11/2017 04:34 PM    Calcium 8.6 08/11/2017 04:34 PM    Bilirubin, total 0.3 08/11/2017 04:34 PM    AST (SGOT) 10 08/11/2017 04:34 PM    Alk.  phosphatase 71 08/11/2017 04:34 PM    Protein, total 7.3 08/11/2017 04:34 PM    Albumin 3.2 08/11/2017 04:34 PM    Globulin 4.1 08/11/2017 04:34 PM    A-G Ratio 0.8 08/11/2017 04:34 PM ALT (SGPT) 16 08/11/2017 04:34 PM       Lab Results   Component Value Date/Time    WBC 13.0 08/11/2017 04:34 PM    HGB 14.6 08/11/2017 04:34 PM    HCT 41.1 08/11/2017 04:34 PM    PLATELET 526 01/75/5841 04:34 PM    MCV 79.2 08/11/2017 04:34 PM     Lab Results   Component Value Date/Time    Cholesterol, total 192 01/23/2017 03:18 PM    HDL Cholesterol 38 01/23/2017 03:18 PM    LDL, calculated 133 01/23/2017 03:18 PM    VLDL, calculated 21 01/23/2017 03:18 PM    Triglyceride 105 01/23/2017 03:18 PM       Lab Results   Component Value Date/Time    TSH 1.190 01/23/2017 03:18 PM               CARDIAC DIAGNOSTICS:     Cardiac Evaluation Includes:    EKG 3/23/17 - NSR, prolonged QT  EKG 9/11/17 - NSR, normal (QTc 460)         ASSESSMENT AND PLAN:     Assessment and Plan:  1) HTN   - she says her BP is only up when she has her migraines. - takes HCTZ for edema and denies usual HTN  - BP usually looks good at Dr. Orlando Salguero office past year   - Will follow BP - cont HCTZ     2) Vertigo and BPPV  - She is not orthostatic on exam and her symptoms don't sound orthostatic in nature  - Defer Vertigo management to Neurology   - She is concerned about carotid disease since her dad had problems with that. Check carotid dopplers  - check an echo     3) Hypokalemia  - due to HCTZ  - cont Kdur - and follow-up per Dr. Angelita Gold     4) Phone FU after testing. See me back as needed. Patient expressed understanding of the plan - questions were answered. Works for [employer]. Jazmyne Olea MD, [employer], [address]. [address]  Ph: [phone]   Ph [phone]        ADDENDUM   9/22/2017  Echo 9/22/17 - :LVEF 60-65%  Carotid Doppler 9/22/1 7- normal study     Will have nurse call with normal results.    Her cardiac findings are normal and her migraines and vertigo are not cardiac in nature.

## 2017-09-11 NOTE — LETTER
9/11/2017 3:47 PM 
 
Ms. Patrick Ledezma 
[facility] [address] [facility] [id number] To Whom It May Concern: This is to verify that Ms Jean Nicole was seen in the office today. Sincerely, Tori Keith MD

## 2017-09-22 ENCOUNTER — CLINICAL SUPPORT (OUTPATIENT)
Dept: CARDIOLOGY CLINIC | Age: 47
End: 2017-09-22

## 2017-09-22 DIAGNOSIS — R42 DIZZINESS: Primary | ICD-10-CM

## 2017-09-22 DIAGNOSIS — I15.9 SECONDARY HYPERTENSION: Primary | ICD-10-CM

## 2017-09-22 DIAGNOSIS — H53.8 BLURRED VISION, BILATERAL: ICD-10-CM

## 2017-09-22 DIAGNOSIS — R42 DIZZINESS: ICD-10-CM

## 2017-09-22 DIAGNOSIS — G43.909 MIGRAINE SYNDROME: ICD-10-CM

## 2017-09-25 ENCOUNTER — TELEPHONE (OUTPATIENT)
Dept: CARDIOLOGY CLINIC | Age: 47
End: 2017-09-25

## 2017-09-25 NOTE — TELEPHONE ENCOUNTER
----- Message from Clare Avalos MD sent at 9/22/2017  9:37 PM EDT -----  Regarding: please call patient  Romainenayeli Alaner - please call patient. Echo 9/22/17 - :LVEF 60-65%  Carotid Doppler 9/22/1 7- normal study     Will have nurse call with normal results. Her cardiac findings are normal and her migraines and vertigo are not cardiac in nature.        Thanks,  SK

## 2017-10-16 ENCOUNTER — HOSPITAL ENCOUNTER (OUTPATIENT)
Dept: GENERAL RADIOLOGY | Age: 47
Discharge: HOME | End: 2017-10-16
Attending: OBSTETRICS & GYNECOLOGY
Payer: COMMERCIAL

## 2017-10-16 ENCOUNTER — HOSPITAL ENCOUNTER (OUTPATIENT)
Dept: PREADMISSION TESTING | Age: 47
Discharge: HOME | End: 2017-10-16
Payer: COMMERCIAL

## 2017-10-16 VITALS
TEMPERATURE: 98.2 F | HEIGHT: 62 IN | SYSTOLIC BLOOD PRESSURE: 130 MMHG | RESPIRATION RATE: 19 BRPM | OXYGEN SATURATION: 97 % | BODY MASS INDEX: 48.56 KG/M2 | DIASTOLIC BLOOD PRESSURE: 79 MMHG | WEIGHT: 263.89 LBS

## 2017-10-16 LAB
ABO + RH BLD: NORMAL
AMORPH CRY URNS QL MICRO: ABNORMAL
ANION GAP SERPL CALC-SCNC: 9 MMOL/L (ref 5–15)
APPEARANCE UR: ABNORMAL
BACTERIA URNS QL MICRO: NEGATIVE /HPF
BASOPHILS # BLD: 0 K/UL (ref 0–0.1)
BASOPHILS NFR BLD: 0 % (ref 0–1)
BILIRUB UR QL: NEGATIVE
BLOOD GROUP ANTIBODIES SERPL: NORMAL
BUN SERPL-MCNC: 11 MG/DL (ref 6–20)
BUN/CREAT SERPL: 13 (ref 12–20)
CALCIUM SERPL-MCNC: 8.6 MG/DL (ref 8.5–10.1)
CHLORIDE SERPL-SCNC: 109 MMOL/L (ref 97–108)
CO2 SERPL-SCNC: 23 MMOL/L (ref 21–32)
COLOR UR: ABNORMAL
CREAT SERPL-MCNC: 0.84 MG/DL (ref 0.55–1.02)
EOSINOPHIL # BLD: 0.1 K/UL (ref 0–0.4)
EOSINOPHIL NFR BLD: 2 % (ref 0–7)
EPITH CASTS URNS QL MICRO: ABNORMAL /LPF
ERYTHROCYTE [DISTWIDTH] IN BLOOD BY AUTOMATED COUNT: 15.6 % (ref 11.5–14.5)
GLUCOSE SERPL-MCNC: 90 MG/DL (ref 65–100)
GLUCOSE UR STRIP.AUTO-MCNC: NEGATIVE MG/DL
HCT VFR BLD AUTO: 38.2 % (ref 35–47)
HGB BLD-MCNC: 13.3 G/DL (ref 11.5–16)
HGB UR QL STRIP: NEGATIVE
KETONES UR QL STRIP.AUTO: NEGATIVE MG/DL
LEUKOCYTE ESTERASE UR QL STRIP.AUTO: NEGATIVE
LYMPHOCYTES # BLD: 1.6 K/UL (ref 0.8–3.5)
LYMPHOCYTES NFR BLD: 19 % (ref 12–49)
MCH RBC QN AUTO: 27.7 PG (ref 26–34)
MCHC RBC AUTO-ENTMCNC: 34.8 G/DL (ref 30–36.5)
MCV RBC AUTO: 79.4 FL (ref 80–99)
MONOCYTES # BLD: 0.6 K/UL (ref 0–1)
MONOCYTES NFR BLD: 7 % (ref 5–13)
NEUTS SEG # BLD: 5.8 K/UL (ref 1.8–8)
NEUTS SEG NFR BLD: 72 % (ref 32–75)
NITRITE UR QL STRIP.AUTO: NEGATIVE
PH UR STRIP: 7.5 [PH] (ref 5–8)
PLATELET # BLD AUTO: 329 K/UL (ref 150–400)
POTASSIUM SERPL-SCNC: 3.7 MMOL/L (ref 3.5–5.1)
PROT UR STRIP-MCNC: ABNORMAL MG/DL
RBC # BLD AUTO: 4.81 M/UL (ref 3.8–5.2)
RBC #/AREA URNS HPF: ABNORMAL /HPF (ref 0–5)
SODIUM SERPL-SCNC: 141 MMOL/L (ref 136–145)
SP GR UR REFRACTOMETRY: 1.01 (ref 1–1.03)
SPECIMEN EXP DATE BLD: NORMAL
UA: UC IF INDICATED,UAUC: ABNORMAL
UROBILINOGEN UR QL STRIP.AUTO: 2 EU/DL (ref 0.2–1)
WBC # BLD AUTO: 8.2 K/UL (ref 3.6–11)
WBC URNS QL MICRO: ABNORMAL /HPF (ref 0–4)

## 2017-10-16 PROCEDURE — 80048 BASIC METABOLIC PNL TOTAL CA: CPT | Performed by: OBSTETRICS & GYNECOLOGY

## 2017-10-16 PROCEDURE — 81001 URINALYSIS AUTO W/SCOPE: CPT | Performed by: OBSTETRICS & GYNECOLOGY

## 2017-10-16 PROCEDURE — 36415 COLL VENOUS BLD VENIPUNCTURE: CPT | Performed by: OBSTETRICS & GYNECOLOGY

## 2017-10-16 PROCEDURE — 86900 BLOOD TYPING SEROLOGIC ABO: CPT | Performed by: OBSTETRICS & GYNECOLOGY

## 2017-10-16 PROCEDURE — 71020 XR CHEST PA LAT: CPT

## 2017-10-16 PROCEDURE — 85025 COMPLETE CBC W/AUTO DIFF WBC: CPT | Performed by: OBSTETRICS & GYNECOLOGY

## 2017-10-16 RX ORDER — VERAPAMIL HYDROCHLORIDE 180 MG/1
180 CAPSULE, EXTENDED RELEASE ORAL
COMMUNITY

## 2017-10-16 RX ORDER — TOPIRAMATE 100 MG/1
200 TABLET, FILM COATED ORAL
COMMUNITY

## 2017-10-16 RX ORDER — RIZATRIPTAN BENZOATE 10 MG/1
10 TABLET, ORALLY DISINTEGRATING ORAL
COMMUNITY

## 2017-10-16 RX ORDER — TOPIRAMATE 100 MG/1
100 TABLET, FILM COATED ORAL
COMMUNITY

## 2017-10-16 RX ORDER — PSEUDOEPHEDRINE HCL 30 MG
60 TABLET ORAL
COMMUNITY

## 2017-10-16 NOTE — PERIOP NOTES
Called Dr Velia Krishna office to request 1.  new orders  Pt states she is not having her ovaries removed and 2.   New antibiotic orders as pt states she is allergic to Flagyl

## 2017-10-16 NOTE — PERIOP NOTES
[facility]  PREOPERATIVE INSTRUCTIONS    Surgery Date:   10/25/2017  Surgery arrival time given by surgeon: NO   If [facility] staff will call you between 4 PM- 8 PM the day before surgery with your arrival time. If your surgery is on a Monday, we will call you the preceding Friday. Please call [phone] after 8 PM if you did not receive your arrival time. 1. Please report at the designated time to the [address] [place] [address] [address] [facility]. Bring your insurance card, photo identification, and any copayment ( if applicable). 2. You must have a responsible adult to drive you home. You need to have a responsible adult to stay with you the first 24 hours after surgery if you are going home the same day of your surgery and you should not drive a car for 24 hours following your surgery. 3. Nothing to eat or drink after midnight the night before surgery. This includes no water, gum, mints, coffee, juice, etc.  Please note special instructions, if applicable, below for medications. 4. MEDICATIONS TO TAKE THE MORNING OF SURGERY WITH A SIP OF WATER: ____Topamax, _Advair, Celexa, Nexium, _Spiriva,    5. Bring your proAir inhaler with you to hospital        Your prescription pain medicine may be taken with a sip of water the morning of surgery  6. No alcoholic beverages 24 hours before or after your surgery. 7. If you are being admitted to the hospital, please leave personal belongings/luggage in your car until you have an assigned hospital room number. 8. Stop Aspirin and/or any non-steroidal anti-inflammatory drugs (i.e. Ibuprofen, Naproxen, Advil, Aleve) as directed by your surgeon. You may take Tylenol. 9. Stop herbal supplements 1 week prior to surgery. 10. If you are currently taking Plavix, Coumadin,or any other blood-thinning/anticoagulant medication contact your surgeon for instructions. 11. Please wear comfortable clothes. Wear your glasses instead of contacts.  We ask that all money, jewelry and valuables be left at home. Wear no make-up, particularly mascara, the day of surgery. 12.  All body piercings, rings and jewelry need to be removed and left at home. Please wear your hair loose or down. Please no pony-tails, buns, or any metal hair accessories. If you shower the morning of surgery, please do not apply any lotions, powders, or deodorants afterwards. Do not shave any body area within 24 hours of your surgery. 13. Please follow all instructions to avoid any potential surgical cancellation. 14.  Should your physical condition change, (i.e. fever, cold, flu, etc.) please notify your surgeon as soon as possible. 15. It is important to be on time. If a situation occurs where you may be delayed, please call:  [phone] / [phone] on the day of surgery. 16. The Preadmission Testing staff can be reached at 21 [phone] .  16. Bring your completed Medication Reconciliation sheet with your the morning of surgery  Special instructions: If you are being admitted, please have the family member or person taking you home at the hospital by 11 am on the day of discharge to allow for them to participate in your discharge instructions. · Free  Parking between 7am & 5pm  The patient was contacted  in person. She  verbalizes  understanding of all instructions   Medications reviewed and med reconciliation sheets for prescriptions given to patient for review & return day of surgery.

## 2017-10-17 ENCOUNTER — OFFICE VISIT (OUTPATIENT)
Dept: FAMILY MEDICINE CLINIC | Age: 47
End: 2017-10-17

## 2017-10-17 VITALS
WEIGHT: 262 LBS | OXYGEN SATURATION: 97 % | HEIGHT: 62 IN | TEMPERATURE: 98.5 F | SYSTOLIC BLOOD PRESSURE: 126 MMHG | HEART RATE: 69 BPM | BODY MASS INDEX: 48.21 KG/M2 | DIASTOLIC BLOOD PRESSURE: 84 MMHG | RESPIRATION RATE: 20 BRPM

## 2017-10-17 DIAGNOSIS — I10 ESSENTIAL HYPERTENSION: ICD-10-CM

## 2017-10-17 DIAGNOSIS — J40 BRONCHITIS: Primary | ICD-10-CM

## 2017-10-17 LAB
BACTERIA SPEC CULT: NORMAL
BACTERIA SPEC CULT: NORMAL
SERVICE CMNT-IMP: NORMAL

## 2017-10-17 RX ORDER — CEFDINIR 300 MG/1
300 CAPSULE ORAL 2 TIMES DAILY
Qty: 20 CAP | Refills: 0 | Status: SHIPPED | OUTPATIENT
Start: 2017-10-17 | End: 2017-10-27

## 2017-10-17 RX ORDER — FLUCONAZOLE 150 MG/1
150 TABLET ORAL DAILY
Qty: 1 TAB | Refills: 5 | Status: SHIPPED | OUTPATIENT
Start: 2017-10-17 | End: 2017-10-18

## 2017-10-17 NOTE — PERIOP NOTES
UA results sent to Dr. Barbara German. Did not trigger culture, but overall abnormal. Sent via EMR fax

## 2017-10-17 NOTE — MR AVS SNAPSHOT
Visit Information Date & Time Provider Department Dept. Phone Encounter #  
 10/17/2017 11:00 AM Dyan Rey MD [address] [facility] [phone] [id number] Follow-up Instructions Return if symptoms worsen or fail to improve. Upcoming Health Maintenance Date Due Pneumococcal 19-64 Medium Risk (1 of 1 - PPSV23) 3/2/1989 DTaP/Tdap/Td series (1 - Tdap) 3/2/1991 PAP AKA CERVICAL CYTOLOGY 3/2/1991 INFLUENZA AGE 9 TO ADULT 8/1/2017 Allergies as of 10/17/2017  Review Complete On: 10/17/2017 By: Dyan Rey MD  
  
 Severity Noted Reaction Type Reactions Chocolate Flavor [Flavoring Agent] High 09/12/2014    Anaphylaxis Flagyl [Metronidazole] High 05/02/2012    Hives, Itching, Swelling Generalized itching, hives, eyes swelled Hazelnut [Tree Nut] High 09/12/2014    Anaphylaxis [facility] 09/12/2014    Anaphylaxis Other Medication High 05/02/2012    Anaphylaxis NUTS Peanut High 09/12/2014    Anaphylaxis Sunflower Oil High 09/12/2014    Anaphylaxis Amlodipine  09/12/2014    Itching Eye swelling Celebrex [Celecoxib]  05/02/2012    Itching Clindamycin  05/02/2012    Itching Doxycycline  05/02/2012    Itching Hydrocodone  09/12/2014    Hives, Itching Eye swelling Levofloxacin  09/12/2014    Hives, Shortness of Breath, Itching Lortab [Hydrocodone-acetaminophen]  05/02/2012    Itching Mold  09/11/2017    Vertigo Pcn [Penicillins]  05/02/2012    Itching Prednisone  05/02/2012    Itching, Other (comments) Muscle spasms Tolerated medrol does packs Sesame Oil  09/12/2014    Hives, Shortness of Breath, Itching Shellfish Containing Products  05/02/2012    Itching Current Immunizations  Reviewed on 7/16/2015 No immunizations on file. Not reviewed this visit You Were Diagnosed With   
  
 Codes Comments Bronchitis    -  Primary ICD-10-CM: P07 ICD-9-CM: 724 Essential hypertension     ICD-10-CM: I10 
ICD-9-CM: 401.9 Vitals BP Pulse Temp Resp Height(growth percentile) Weight(growth percentile) 126/84 69 98.5 °F (36.9 °C) 20 5' 2\" (1.575 m) 262 lb (118.8 kg) LMP SpO2 BMI OB Status Smoking Status 10/02/2017 97% 47.92 kg/m2 Having regular periods Current Every Day Smoker Vitals History BMI and BSA Data Body Mass Index Body Surface Area  
 47.92 kg/m 2 2.28 m 2 Preferred Pharmacy Pharmacy Name Phone [facility] [address] [facility], [address] [place] [address] [phone] Your Updated Medication List  
  
   
This list is accurate as of: 10/17/17 11:33 AM.  Always use your most recent med list.  
  
  
  
  
 ADVAIR DISKUS 500-50 mcg/dose diskus inhaler Generic drug:  fluticasone-salmeterol 1 Puff two (2) times a day. Breakfast & bedtime  Indications: MAINTENANCE THERAPY FOR ASTHMA * albuterol 5 mg/mL nebulizer solution Commonly known as:  PROVENTIL  
by Nebulization route every six (6) hours as needed. * PROAIR HFA 90 mcg/actuation inhaler Generic drug:  albuterol  
as needed. albuterol-ipratropium 2.5 mg-0.5 mg/3 ml Nebu Commonly known as:  DUO-NEB  
3 mL by Nebulization route. calcium 500 mg Tab Take  by mouth two (2) times a day. cefdinir 300 mg capsule Commonly known as:  OMNICEF Take 1 Cap by mouth two (2) times a day for 10 days. citalopram 20 mg tablet Commonly known as:  CELEXA  
TAKE 1 TABLET BY MOUTH DAILY EPINEPHrine 0.3 mg/0.3 mL injection Commonly known as:  EPIPEN  
0.3 mL by IntraMUSCular route once as needed. esomeprazole 40 mg capsule Commonly known as:  NEXIUM  
TAKE 1 CAPSULE BY MOUTH DAILY ferrous sulfate 325 mg (65 mg iron) tablet Take  by mouth two (2) times a day. guaiFENesin-codeine 100-10 mg/5 mL solution Commonly known as:  ROBITUSSIN AC  
 Take 5 mL by mouth three (3) times daily as needed for Cough. hydroCHLOROthiazide 25 mg tablet Commonly known as:  HYDRODIURIL  
TAKE 1 TABLET BY MOUTH DAILY  
  
 ibuprofen 800 mg tablet Commonly known as:  MOTRIN Take 1 Tab by mouth every eight (8) hours as needed for Pain. meclizine 12.5 mg tablet Commonly known as:  ANTIVERT Take 1 Tab by mouth three (3) times daily as needed for Dizziness or Nausea. multivitamin tablet Commonly known as:  ONE A DAY Take 1 Tab by mouth daily. potassium chloride SR 10 mEq tablet Commonly known as:  KLOR-CON 10  
TAKE 2 TABLETS BY MOUTH DAILY pseudoephedrine 30 mg tablet Commonly known as:  SUDAFED Take 60 mg by mouth every six (6) hours as needed for Congestion. rizatriptan 10 mg disintegrating tablet Commonly known as:  MAXALT-MLT Take 10 mg by mouth once as needed for Migraine. SINGULAIR 10 mg tablet Generic drug:  montelukast  
Take 10 mg by mouth nightly. SPIRIVA RESPIMAT 1.25 mcg/actuation inhaler Generic drug:  tiotropium bromide INL 2 PUFFS PO ONCE Day * TOPAMAX 100 mg tablet Generic drug:  topiramate Take 100 mg by mouth daily (with breakfast). * TOPAMAX 100 mg tablet Generic drug:  topiramate Take 200 mg by mouth nightly. triamcinolone acetonide 0.5 % ointment Commonly known as:  KENALOG Apply  to affected area as needed for Skin Irritation. use thin layer  
  
 verapamil  mg CR capsule Commonly known as:  Evelena Favia Take 180 mg by mouth nightly. VITAMIN D3 PO Take 1 Cap by mouth daily. XOLAIR 150 mg Solr Generic drug:  omalizumab  
every thirty (30) days. Pt does not know dose   Takes an injection every 30 days ZOFRAN (AS HYDROCHLORIDE) 4 mg tablet Generic drug:  ondansetron hcl Take 4 mg by mouth every eight (8) hours as needed for Nausea. zolpidem 10 mg tablet Commonly known as:  AMBIEN  
 Take 1 Tab by mouth nightly as needed for Sleep. Max Daily Amount: 10 mg.  
  
 * Notice: This list has 4 medication(s) that are the same as other medications prescribed for you. Read the directions carefully, and ask your doctor or other care provider to review them with you. Prescriptions Sent to Pharmacy Refills  
 cefdinir (OMNICEF) 300 mg capsule 0 Sig: Take 1 Cap by mouth two (2) times a day for 10 days. Class: Normal  
 Pharmacy: [facility] [address] [place] [address] [address] [place] [address] #: [phone] Route: Oral  
  
Follow-up Instructions Return if symptoms worsen or fail to improve. Introducing [facility] & HEALTH SERVICES! [facility] introduces [employer] patient portal. Now you can access parts of your medical record, email your doctor's office, and request medication refills online. 1. In your internet browser, go to [email or web address] 2. Click on the First Time User? Click Here link in the Sign In box. You will see the New Member Sign Up page. 3. Enter your [employer]ie Access Code exactly as it appears below. You will not need to use this code after youve completed the sign-up process. If you do not sign up before the expiration date, you must request a new code. · [employer] Access Code: [email or web address] Expires: 10/23/2017 12:32 PM 
 
4. Enter the last four digits of your Social Security Number (xxxx) and Date of Birth (mm/dd/yyyy) as indicated and click Submit. You will be taken to the next sign-up page. 5. Create a Ampl[employer]atat ID. This will be your [employer]Genie login ID and cannot be changed, so think of one that is secure and easy to remember. 6. Create a [employer]ie password. You can change your password at any time. 7. Enter your Password Reset Question and Answer. This can be used at a later time if you forget your password. 8. Enter your e-mail address.  You will receive e-mail notification when new information is available in [employer]. 9. Click Sign Up. You can now view and download portions of your medical record. 10. Click the Download Summary menu link to download a portable copy of your medical information. If you have questions, please visit the Frequently Asked Questions section of the [employer] website. Remember, [employer] is NOT to be used for urgent needs. For medical emergencies, dial 911. Now available from your iPhone and Android! Please provide this summary of care documentation to your next provider. Your primary care clinician is listed as Pete Fitzgerald. If you have any questions after today's visit, please call [phone].

## 2017-10-17 NOTE — PROGRESS NOTES
Patient c/o cough, cold sx, sinus drainage, headache since Friday. Patient states she is having a hysterectomy next Wed, She needs to feel better before surgery. 1. Have you been to the ER, urgent care clinic since your last visit? Hospitalized since your last visit? No    2. Have you seen or consulted any other health care providers outside of the [address] [place] [address] since your last visit? Include any pap smears or colon screening. No       Chief Complaint   Patient presents with    Cough     cough, headache , shortness of breath since Friday, headache x 2 weeks     She is a 52 y.o. female who presents for evalution. Reviewed PmHx, RxHx, FmHx, SocHx, AllgHx and updated and dated in the chart. Patient Active Problem List    Diagnosis    Depression    Migraine    Asthma    Hypertension       Review of Systems - negative except as listed above in the HPI    Objective:     Vitals:    10/17/17 1119   BP: 126/84   Pulse: 69   Resp: 20   Temp: 98.5 °F (36.9 °C)   SpO2: 97%   Weight: 262 lb (118.8 kg)   Height: 5' 2\" (1.575 m)     Physical Examination: General appearance - alert, well appearing, and in no distress  Neck - supple, no significant adenopathy  Chest - wheezing noted diffuse  Heart - normal rate, regular rhythm, normal S1, S2, no murmurs, rubs, clicks or gallops      Assessment/ Plan:   Diagnoses and all orders for this visit:    1. Bronchitis  -     cefdinir (OMNICEF) 300 mg capsule; Take 1 Cap by mouth two (2) times a day for 10 days.  -cont all inhalers  -dwp importance of no smoking    2. Essential hypertension  -at goal       Follow-up Disposition:  Return if symptoms worsen or fail to improve. I have discussed the diagnosis with the patient and the intended plan as seen in the above orders. The patient understands and agrees with the plan. The patient has received an after-visit summary and questions were answered concerning future plans.      Medication Side Effects and Warnings were discussed with patient  Patient Labs were reviewed and or requested:  Patient Past Records were reviewed and or requested    Holly Mccracken M.D. There are no Patient Instructions on file for this visit.

## 2017-10-19 RX ORDER — AZITHROMYCIN 250 MG/1
TABLET, FILM COATED ORAL
Qty: 6 TAB | Refills: 0 | Status: SHIPPED | OUTPATIENT
Start: 2017-10-19 | End: 2018-05-21 | Stop reason: ALTCHOICE

## 2017-10-24 ENCOUNTER — ANESTHESIA EVENT (OUTPATIENT)
Dept: SURGERY | Age: 47
End: 2017-10-24
Payer: COMMERCIAL

## 2017-10-24 NOTE — PERIOP NOTES
Requested new orders and notified the office of patient's stated drug allergies to both Flagyl and PCN. New orders to be faxed to PAT.

## 2017-10-25 ENCOUNTER — HOSPITAL ENCOUNTER (OUTPATIENT)
Age: 47
Setting detail: OBSERVATION
Discharge: HOME | End: 2017-10-26
Attending: OBSTETRICS & GYNECOLOGY | Admitting: OBSTETRICS & GYNECOLOGY
Payer: COMMERCIAL

## 2017-10-25 ENCOUNTER — ANESTHESIA (OUTPATIENT)
Dept: SURGERY | Age: 47
End: 2017-10-25
Payer: COMMERCIAL

## 2017-10-25 PROBLEM — N80.9 ENDOMETRIOSIS: Status: ACTIVE | Noted: 2017-10-25

## 2017-10-25 PROCEDURE — 77030018836 HC SOL IRR NACL ICUM -A: Performed by: OBSTETRICS & GYNECOLOGY

## 2017-10-25 PROCEDURE — 77030010545: Performed by: OBSTETRICS & GYNECOLOGY

## 2017-10-25 PROCEDURE — 88307 TISSUE EXAM BY PATHOLOGIST: CPT | Performed by: OBSTETRICS & GYNECOLOGY

## 2017-10-25 PROCEDURE — 77030016151 HC PROTCTR LNS DFOG COVD -B: Performed by: OBSTETRICS & GYNECOLOGY

## 2017-10-25 PROCEDURE — 74011000250 HC RX REV CODE- 250: Performed by: OBSTETRICS & GYNECOLOGY

## 2017-10-25 PROCEDURE — 76060000036 HC ANESTHESIA 2.5 TO 3 HR: Performed by: OBSTETRICS & GYNECOLOGY

## 2017-10-25 PROCEDURE — 99218 HC RM OBSERVATION: CPT

## 2017-10-25 PROCEDURE — 77030034850: Performed by: OBSTETRICS & GYNECOLOGY

## 2017-10-25 PROCEDURE — 74011000250 HC RX REV CODE- 250

## 2017-10-25 PROCEDURE — 74011250636 HC RX REV CODE- 250/636

## 2017-10-25 PROCEDURE — 77030008756 HC TU IRR SUC STRY -B: Performed by: OBSTETRICS & GYNECOLOGY

## 2017-10-25 PROCEDURE — 77030009848 HC PASSR SUT SET COOP -C: Performed by: OBSTETRICS & GYNECOLOGY

## 2017-10-25 PROCEDURE — 77030013079 HC BLNKT BAIR HGGR 3M -A: Performed by: NURSE ANESTHETIST, CERTIFIED REGISTERED

## 2017-10-25 PROCEDURE — 76010000877 HC OR TIME 2.5 TO 3HR INTENSV - TIER 2: Performed by: OBSTETRICS & GYNECOLOGY

## 2017-10-25 PROCEDURE — 77030027744 HC PWDR HEMSTAT ARISTA ABSRB 5GM BARD -D: Performed by: OBSTETRICS & GYNECOLOGY

## 2017-10-25 PROCEDURE — 77030037032 HC INSRT SCIS CLICKLLINE DISP STOR -B: Performed by: OBSTETRICS & GYNECOLOGY

## 2017-10-25 PROCEDURE — 77030019908 HC STETH ESOPH SIMS -A: Performed by: NURSE ANESTHETIST, CERTIFIED REGISTERED

## 2017-10-25 PROCEDURE — 77030020703 HC SEAL CANN DISP INTU -B: Performed by: OBSTETRICS & GYNECOLOGY

## 2017-10-25 PROCEDURE — 77030026438 HC STYL ET INTUB CARD -A: Performed by: NURSE ANESTHETIST, CERTIFIED REGISTERED

## 2017-10-25 PROCEDURE — 77030008771 HC TU NG SALEM SUMP -A: Performed by: NURSE ANESTHETIST, CERTIFIED REGISTERED

## 2017-10-25 PROCEDURE — 74011250636 HC RX REV CODE- 250/636: Performed by: ANESTHESIOLOGY

## 2017-10-25 PROCEDURE — 77030020782 HC GWN BAIR PAWS FLX 3M -B

## 2017-10-25 PROCEDURE — 77030029357 HC DEV CLSR FAC SYS EFX TELE -C: Performed by: OBSTETRICS & GYNECOLOGY

## 2017-10-25 PROCEDURE — 77030033067 HC SUT PDO STRATFX SPIR J&J -B: Performed by: OBSTETRICS & GYNECOLOGY

## 2017-10-25 PROCEDURE — 77030002933 HC SUT MCRYL J&J -A: Performed by: OBSTETRICS & GYNECOLOGY

## 2017-10-25 PROCEDURE — 77030011640 HC PAD GRND REM COVD -A: Performed by: OBSTETRICS & GYNECOLOGY

## 2017-10-25 PROCEDURE — 77030035044 HC TRCR ENDOSC VRSPRT BLDLSS COVD -C: Performed by: OBSTETRICS & GYNECOLOGY

## 2017-10-25 PROCEDURE — 77030035277 HC OBTRTR BLDELSS DISP INTU -B: Performed by: OBSTETRICS & GYNECOLOGY

## 2017-10-25 PROCEDURE — 74011250636 HC RX REV CODE- 250/636: Performed by: OBSTETRICS & GYNECOLOGY

## 2017-10-25 PROCEDURE — 74011258636 HC RX REV CODE- 258/636: Performed by: OBSTETRICS & GYNECOLOGY

## 2017-10-25 PROCEDURE — 77030008684 HC TU ET CUF COVD -B: Performed by: NURSE ANESTHETIST, CERTIFIED REGISTERED

## 2017-10-25 PROCEDURE — 77030022704 HC SUT VLOC COVD -B: Performed by: OBSTETRICS & GYNECOLOGY

## 2017-10-25 PROCEDURE — 77030018673: Performed by: OBSTETRICS & GYNECOLOGY

## 2017-10-25 PROCEDURE — 74011250637 HC RX REV CODE- 250/637: Performed by: OBSTETRICS & GYNECOLOGY

## 2017-10-25 PROCEDURE — 76210000016 HC OR PH I REC 1 TO 1.5 HR: Performed by: OBSTETRICS & GYNECOLOGY

## 2017-10-25 PROCEDURE — 77030035043 HC TRCR ENDOSC OPTCL BLDLSS COVD -B: Performed by: OBSTETRICS & GYNECOLOGY

## 2017-10-25 PROCEDURE — 77030010507 HC ADH SKN DERMBND J&J -B: Performed by: OBSTETRICS & GYNECOLOGY

## 2017-10-25 PROCEDURE — 77030018778 HC MANIP UTER VCAR CNMD -B: Performed by: OBSTETRICS & GYNECOLOGY

## 2017-10-25 RX ORDER — GLYCOPYRROLATE 0.2 MG/ML
INJECTION INTRAMUSCULAR; INTRAVENOUS AS NEEDED
Status: DISCONTINUED | OUTPATIENT
Start: 2017-10-25 | End: 2017-10-25 | Stop reason: HOSPADM

## 2017-10-25 RX ORDER — SODIUM CHLORIDE 0.9 % (FLUSH) 0.9 %
5-10 SYRINGE (ML) INJECTION EVERY 8 HOURS
Status: DISCONTINUED | OUTPATIENT
Start: 2017-10-25 | End: 2017-10-25 | Stop reason: HOSPADM

## 2017-10-25 RX ORDER — DIPHENHYDRAMINE HYDROCHLORIDE 50 MG/ML
12.5 INJECTION, SOLUTION INTRAMUSCULAR; INTRAVENOUS AS NEEDED
Status: DISCONTINUED | OUTPATIENT
Start: 2017-10-25 | End: 2017-10-25 | Stop reason: HOSPADM

## 2017-10-25 RX ORDER — SUCCINYLCHOLINE CHLORIDE 20 MG/ML
INJECTION INTRAMUSCULAR; INTRAVENOUS AS NEEDED
Status: DISCONTINUED | OUTPATIENT
Start: 2017-10-25 | End: 2017-10-25 | Stop reason: HOSPADM

## 2017-10-25 RX ORDER — SODIUM CHLORIDE 0.9 % (FLUSH) 0.9 %
5-10 SYRINGE (ML) INJECTION AS NEEDED
Status: DISCONTINUED | OUTPATIENT
Start: 2017-10-25 | End: 2017-10-25 | Stop reason: HOSPADM

## 2017-10-25 RX ORDER — PROCHLORPERAZINE EDISYLATE 5 MG/ML
10 INJECTION INTRAMUSCULAR; INTRAVENOUS
Status: DISCONTINUED | OUTPATIENT
Start: 2017-10-25 | End: 2017-10-26 | Stop reason: HOSPADM

## 2017-10-25 RX ORDER — FENTANYL CITRATE 50 UG/ML
INJECTION, SOLUTION INTRAMUSCULAR; INTRAVENOUS AS NEEDED
Status: DISCONTINUED | OUTPATIENT
Start: 2017-10-25 | End: 2017-10-25 | Stop reason: HOSPADM

## 2017-10-25 RX ORDER — SODIUM CHLORIDE 0.9 % (FLUSH) 0.9 %
5-10 SYRINGE (ML) INJECTION AS NEEDED
Status: DISCONTINUED | OUTPATIENT
Start: 2017-10-25 | End: 2017-10-26 | Stop reason: HOSPADM

## 2017-10-25 RX ORDER — PROPOFOL 10 MG/ML
INJECTION, EMULSION INTRAVENOUS AS NEEDED
Status: DISCONTINUED | OUTPATIENT
Start: 2017-10-25 | End: 2017-10-25 | Stop reason: HOSPADM

## 2017-10-25 RX ORDER — ONDANSETRON 2 MG/ML
INJECTION INTRAMUSCULAR; INTRAVENOUS AS NEEDED
Status: DISCONTINUED | OUTPATIENT
Start: 2017-10-25 | End: 2017-10-25 | Stop reason: HOSPADM

## 2017-10-25 RX ORDER — ROCURONIUM BROMIDE 10 MG/ML
INJECTION, SOLUTION INTRAVENOUS AS NEEDED
Status: DISCONTINUED | OUTPATIENT
Start: 2017-10-25 | End: 2017-10-25 | Stop reason: HOSPADM

## 2017-10-25 RX ORDER — KETOROLAC TROMETHAMINE 30 MG/ML
15 INJECTION, SOLUTION INTRAMUSCULAR; INTRAVENOUS
Status: DISCONTINUED | OUTPATIENT
Start: 2017-10-25 | End: 2017-10-26 | Stop reason: HOSPADM

## 2017-10-25 RX ORDER — SODIUM CHLORIDE, SODIUM LACTATE, POTASSIUM CHLORIDE, CALCIUM CHLORIDE 600; 310; 30; 20 MG/100ML; MG/100ML; MG/100ML; MG/100ML
125 INJECTION, SOLUTION INTRAVENOUS CONTINUOUS
Status: DISCONTINUED | OUTPATIENT
Start: 2017-10-25 | End: 2017-10-26 | Stop reason: HOSPADM

## 2017-10-25 RX ORDER — SODIUM CHLORIDE, SODIUM LACTATE, POTASSIUM CHLORIDE, CALCIUM CHLORIDE 600; 310; 30; 20 MG/100ML; MG/100ML; MG/100ML; MG/100ML
125 INJECTION, SOLUTION INTRAVENOUS CONTINUOUS
Status: DISCONTINUED | OUTPATIENT
Start: 2017-10-25 | End: 2017-10-25 | Stop reason: HOSPADM

## 2017-10-25 RX ORDER — ENOXAPARIN SODIUM 100 MG/ML
40 INJECTION SUBCUTANEOUS EVERY 24 HOURS
Status: DISCONTINUED | OUTPATIENT
Start: 2017-10-25 | End: 2017-10-25 | Stop reason: DRUGHIGH

## 2017-10-25 RX ORDER — LIDOCAINE HYDROCHLORIDE 20 MG/ML
INJECTION, SOLUTION EPIDURAL; INFILTRATION; INTRACAUDAL; PERINEURAL AS NEEDED
Status: DISCONTINUED | OUTPATIENT
Start: 2017-10-25 | End: 2017-10-25 | Stop reason: HOSPADM

## 2017-10-25 RX ORDER — CEFAZOLIN SODIUM 1 G/3ML
INJECTION, POWDER, FOR SOLUTION INTRAMUSCULAR; INTRAVENOUS AS NEEDED
Status: DISCONTINUED | OUTPATIENT
Start: 2017-10-25 | End: 2017-10-25

## 2017-10-25 RX ORDER — ENOXAPARIN SODIUM 100 MG/ML
40 INJECTION SUBCUTANEOUS
Status: COMPLETED | OUTPATIENT
Start: 2017-10-25 | End: 2017-10-25

## 2017-10-25 RX ORDER — NALOXONE HYDROCHLORIDE 0.4 MG/ML
0.2 INJECTION, SOLUTION INTRAMUSCULAR; INTRAVENOUS; SUBCUTANEOUS
Status: DISCONTINUED | OUTPATIENT
Start: 2017-10-25 | End: 2017-10-25 | Stop reason: HOSPADM

## 2017-10-25 RX ORDER — ENOXAPARIN SODIUM 100 MG/ML
40 INJECTION SUBCUTANEOUS EVERY 12 HOURS
Status: DISCONTINUED | OUTPATIENT
Start: 2017-10-26 | End: 2017-10-26 | Stop reason: HOSPADM

## 2017-10-25 RX ORDER — HYDROMORPHONE HYDROCHLORIDE 1 MG/ML
.25-1 INJECTION, SOLUTION INTRAMUSCULAR; INTRAVENOUS; SUBCUTANEOUS
Status: DISCONTINUED | OUTPATIENT
Start: 2017-10-25 | End: 2017-10-25 | Stop reason: HOSPADM

## 2017-10-25 RX ORDER — DEXTROSE, SODIUM CHLORIDE, SODIUM LACTATE, POTASSIUM CHLORIDE, AND CALCIUM CHLORIDE 5; .6; .31; .03; .02 G/100ML; G/100ML; G/100ML; G/100ML; G/100ML
150 INJECTION, SOLUTION INTRAVENOUS CONTINUOUS
Status: DISCONTINUED | OUTPATIENT
Start: 2017-10-25 | End: 2017-10-26 | Stop reason: HOSPADM

## 2017-10-25 RX ORDER — GABAPENTIN 300 MG/1
900 CAPSULE ORAL
Status: COMPLETED | OUTPATIENT
Start: 2017-10-25 | End: 2017-10-25

## 2017-10-25 RX ORDER — LIDOCAINE HYDROCHLORIDE 10 MG/ML
0.1 INJECTION, SOLUTION EPIDURAL; INFILTRATION; INTRACAUDAL; PERINEURAL AS NEEDED
Status: DISCONTINUED | OUTPATIENT
Start: 2017-10-25 | End: 2017-10-25 | Stop reason: HOSPADM

## 2017-10-25 RX ORDER — MIDAZOLAM HYDROCHLORIDE 1 MG/ML
INJECTION, SOLUTION INTRAMUSCULAR; INTRAVENOUS AS NEEDED
Status: DISCONTINUED | OUTPATIENT
Start: 2017-10-25 | End: 2017-10-25 | Stop reason: HOSPADM

## 2017-10-25 RX ORDER — NEOSTIGMINE METHYLSULFATE 1 MG/ML
INJECTION INTRAVENOUS AS NEEDED
Status: DISCONTINUED | OUTPATIENT
Start: 2017-10-25 | End: 2017-10-25 | Stop reason: HOSPADM

## 2017-10-25 RX ORDER — MORPHINE SULFATE 2 MG/ML
1 INJECTION, SOLUTION INTRAMUSCULAR; INTRAVENOUS
Status: DISCONTINUED | OUTPATIENT
Start: 2017-10-25 | End: 2017-10-26 | Stop reason: HOSPADM

## 2017-10-25 RX ORDER — SODIUM CHLORIDE, SODIUM LACTATE, POTASSIUM CHLORIDE, CALCIUM CHLORIDE 600; 310; 30; 20 MG/100ML; MG/100ML; MG/100ML; MG/100ML
INJECTION, SOLUTION INTRAVENOUS
Status: DISCONTINUED | OUTPATIENT
Start: 2017-10-25 | End: 2017-10-25 | Stop reason: HOSPADM

## 2017-10-25 RX ORDER — SODIUM CHLORIDE 0.9 % (FLUSH) 0.9 %
5-10 SYRINGE (ML) INJECTION EVERY 8 HOURS
Status: DISCONTINUED | OUTPATIENT
Start: 2017-10-25 | End: 2017-10-26 | Stop reason: HOSPADM

## 2017-10-25 RX ORDER — OXYCODONE AND ACETAMINOPHEN 5; 325 MG/1; MG/1
1 TABLET ORAL
Status: DISCONTINUED | OUTPATIENT
Start: 2017-10-25 | End: 2017-10-26 | Stop reason: HOSPADM

## 2017-10-25 RX ORDER — LORAZEPAM 2 MG/ML
1 INJECTION INTRAMUSCULAR
Status: DISCONTINUED | OUTPATIENT
Start: 2017-10-25 | End: 2017-10-26 | Stop reason: HOSPADM

## 2017-10-25 RX ORDER — ACETAMINOPHEN 500 MG
1000 TABLET ORAL
Status: COMPLETED | OUTPATIENT
Start: 2017-10-25 | End: 2017-10-25

## 2017-10-25 RX ORDER — FLUMAZENIL 0.1 MG/ML
0.2 INJECTION INTRAVENOUS
Status: DISCONTINUED | OUTPATIENT
Start: 2017-10-25 | End: 2017-10-25 | Stop reason: HOSPADM

## 2017-10-25 RX ORDER — BUPIVACAINE HYDROCHLORIDE AND EPINEPHRINE 5; 5 MG/ML; UG/ML
INJECTION, SOLUTION EPIDURAL; INTRACAUDAL; PERINEURAL AS NEEDED
Status: DISCONTINUED | OUTPATIENT
Start: 2017-10-25 | End: 2017-10-25 | Stop reason: HOSPADM

## 2017-10-25 RX ADMIN — LIDOCAINE HYDROCHLORIDE 80 MG: 20 INJECTION, SOLUTION EPIDURAL; INFILTRATION; INTRACAUDAL; PERINEURAL at 14:11

## 2017-10-25 RX ADMIN — FENTANYL CITRATE 75 MCG: 50 INJECTION, SOLUTION INTRAMUSCULAR; INTRAVENOUS at 14:43

## 2017-10-25 RX ADMIN — ROCURONIUM BROMIDE 20 MG: 10 INJECTION, SOLUTION INTRAVENOUS at 15:26

## 2017-10-25 RX ADMIN — ROCURONIUM BROMIDE 35 MG: 10 INJECTION, SOLUTION INTRAVENOUS at 14:15

## 2017-10-25 RX ADMIN — SODIUM CHLORIDE, POTASSIUM CHLORIDE, SODIUM LACTATE AND CALCIUM CHLORIDE: 600; 310; 30; 20 INJECTION, SOLUTION INTRAVENOUS at 15:52

## 2017-10-25 RX ADMIN — SODIUM CHLORIDE, POTASSIUM CHLORIDE, SODIUM LACTATE AND CALCIUM CHLORIDE: 600; 310; 30; 20 INJECTION, SOLUTION INTRAVENOUS at 13:50

## 2017-10-25 RX ADMIN — SUCCINYLCHOLINE CHLORIDE 200 MG: 20 INJECTION INTRAMUSCULAR; INTRAVENOUS at 14:11

## 2017-10-25 RX ADMIN — PROPOFOL 150 MG: 10 INJECTION, EMULSION INTRAVENOUS at 14:11

## 2017-10-25 RX ADMIN — ROCURONIUM BROMIDE 5 MG: 10 INJECTION, SOLUTION INTRAVENOUS at 14:11

## 2017-10-25 RX ADMIN — MIDAZOLAM HYDROCHLORIDE 3 MG: 1 INJECTION, SOLUTION INTRAMUSCULAR; INTRAVENOUS at 14:00

## 2017-10-25 RX ADMIN — FENTANYL CITRATE 50 MCG: 50 INJECTION, SOLUTION INTRAMUSCULAR; INTRAVENOUS at 14:00

## 2017-10-25 RX ADMIN — ACETAMINOPHEN 1000 MG: 500 TABLET ORAL at 13:20

## 2017-10-25 RX ADMIN — KETOROLAC TROMETHAMINE 15 MG: 30 INJECTION, SOLUTION INTRAMUSCULAR at 21:21

## 2017-10-25 RX ADMIN — GLYCOPYRROLATE 0.6 MG: 0.2 INJECTION INTRAMUSCULAR; INTRAVENOUS at 16:35

## 2017-10-25 RX ADMIN — ROCURONIUM BROMIDE 10 MG: 10 INJECTION, SOLUTION INTRAVENOUS at 14:40

## 2017-10-25 RX ADMIN — SODIUM CHLORIDE, POTASSIUM CHLORIDE, SODIUM LACTATE AND CALCIUM CHLORIDE 125 ML/HR: 600; 310; 30; 20 INJECTION, SOLUTION INTRAVENOUS at 12:33

## 2017-10-25 RX ADMIN — ROCURONIUM BROMIDE 20 MG: 10 INJECTION, SOLUTION INTRAVENOUS at 14:57

## 2017-10-25 RX ADMIN — FENTANYL CITRATE 50 MCG: 50 INJECTION, SOLUTION INTRAMUSCULAR; INTRAVENOUS at 16:41

## 2017-10-25 RX ADMIN — SODIUM CHLORIDE, SODIUM LACTATE, POTASSIUM CHLORIDE, CALCIUM CHLORIDE, AND DEXTROSE MONOHYDRATE 150 ML/HR: 600; 310; 30; 20; 5 INJECTION, SOLUTION INTRAVENOUS at 17:12

## 2017-10-25 RX ADMIN — FENTANYL CITRATE 75 MCG: 50 INJECTION, SOLUTION INTRAMUSCULAR; INTRAVENOUS at 14:11

## 2017-10-25 RX ADMIN — ENOXAPARIN SODIUM 40 MG: 100 INJECTION SUBCUTANEOUS at 13:21

## 2017-10-25 RX ADMIN — GABAPENTIN 900 MG: 300 CAPSULE ORAL at 13:20

## 2017-10-25 RX ADMIN — PROPOFOL 50 MG: 10 INJECTION, EMULSION INTRAVENOUS at 14:50

## 2017-10-25 RX ADMIN — ONDANSETRON 4 MG: 2 INJECTION INTRAMUSCULAR; INTRAVENOUS at 16:28

## 2017-10-25 RX ADMIN — FENTANYL CITRATE 50 MCG: 50 INJECTION, SOLUTION INTRAMUSCULAR; INTRAVENOUS at 15:14

## 2017-10-25 RX ADMIN — Medication 10 ML: at 21:21

## 2017-10-25 RX ADMIN — SODIUM CHLORIDE, SODIUM LACTATE, POTASSIUM CHLORIDE, CALCIUM CHLORIDE: 600; 310; 30; 20 INJECTION, SOLUTION INTRAVENOUS at 13:50

## 2017-10-25 RX ADMIN — NEOSTIGMINE METHYLSULFATE 3 MG: 1 INJECTION INTRAVENOUS at 16:35

## 2017-10-25 RX ADMIN — GLYCOPYRROLATE 0.2 MG: 0.2 INJECTION INTRAMUSCULAR; INTRAVENOUS at 15:06

## 2017-10-25 NOTE — IP AVS SNAPSHOT
[address] 
[phone] Patient: Mary Stahl MRN: [id number] ZPK:[id number] About your hospitalization You were admitted on:  October 25, 2017 You last received care in the:  [facility] 5M1 MED SURG 1 You were discharged on:  October 26, 2017 Why you were hospitalized Your primary diagnosis was:  Not on File Your diagnoses also included:  Endometriosis Things You Need To Do (next 8 weeks) Schedule an appointment with Patience Haywood MD as soon as possible for a visit in 1 week(s) Routine follow up Phone:  [phone] Where:  [address], [facility], [address] [place] [address] Follow up with Benitez Melo MD  
  
Phone:  [phone] Where:  [address], [facility], [address] [place] [address] Discharge Orders None A check schuyler indicates which time of day the medication should be taken. My Medications ASK your physician about these medications Instructions Each Dose to Equal  
 Morning Noon Evening Bedtime ADVAIR DISKUS 500-50 mcg/dose diskus inhaler Generic drug:  fluticasone-salmeterol Your last dose was: Your next dose is:    
   
   
 1 Puff two (2) times a day. Breakfast & bedtime  Indications: MAINTENANCE THERAPY FOR ASTHMA  
 1 Puff * albuterol 5 mg/mL nebulizer solution Commonly known as:  PROVENTIL Your last dose was: Your next dose is:    
   
   
 by Nebulization route every six (6) hours as needed. * PROAIR HFA 90 mcg/actuation inhaler Generic drug:  albuterol Your last dose was: Your next dose is:    
   
   
 as needed. albuterol-ipratropium 2.5 mg-0.5 mg/3 ml Nebu Commonly known as:  Dakota Sparrow Your last dose was: Your next dose is: 3 mL by Nebulization route. 3 mL  
    
   
   
   
  
 azithromycin 250 mg tablet Commonly known as:  Kushal Bonilla Your last dose was: Your next dose is: Take two tablets today then one tablet daily  
     
   
   
   
  
 calcium 500 mg Tab Your last dose was: Your next dose is: Take  by mouth two (2) times a day. cefdinir 300 mg capsule Commonly known as:  OMNICEF Your last dose was: Your next dose is: Take 1 Cap by mouth two (2) times a day for 10 days. 300 mg  
    
   
   
   
  
 citalopram 20 mg tablet Commonly known as:  Axel Menon Your last dose was: Your next dose is: TAKE 1 TABLET BY MOUTH DAILY EPINEPHrine 0.3 mg/0.3 mL injection Commonly known as:  Justo Gardner Your last dose was: Your next dose is: 0.3 mL by IntraMUSCular route once as needed. 0.3 mg  
    
   
   
   
  
 esomeprazole 40 mg capsule Commonly known as:  Bethene Piper Your last dose was: Your next dose is: TAKE 1 CAPSULE BY MOUTH DAILY ferrous sulfate 325 mg (65 mg iron) tablet Your last dose was: Your next dose is: Take  by mouth two (2) times a day. guaiFENesin-codeine 100-10 mg/5 mL solution Commonly known as:  ROBITUSSIN AC Your last dose was: Your next dose is: Take 5 mL by mouth three (3) times daily as needed for Cough. 5 mL  
    
   
   
   
  
 hydroCHLOROthiazide 25 mg tablet Commonly known as:  HYDRODIURIL Your last dose was: Your next dose is: TAKE 1 TABLET BY MOUTH DAILY  
     
   
   
   
  
 ibuprofen 800 mg tablet Commonly known as:  MOTRIN Your last dose was: Your next dose is: Take 1 Tab by mouth every eight (8) hours as needed for Pain.   
 800 mg  
 meclizine 12.5 mg tablet Commonly known as:  ANTIVERT Your last dose was: Your next dose is: Take 1 Tab by mouth three (3) times daily as needed for Dizziness or Nausea. 12.5 mg  
    
   
   
   
  
 multivitamin tablet Commonly known as:  ONE A DAY Your last dose was: Your next dose is: Take 1 Tab by mouth daily. 1 Tab  
    
   
   
   
  
 potassium chloride SR 10 mEq tablet Commonly known as:  KLOR-CON 10 Your last dose was: Your next dose is: TAKE 2 TABLETS BY MOUTH DAILY pseudoephedrine 30 mg tablet Commonly known as:  SUDAFED Your last dose was: Your next dose is: Take 60 mg by mouth every six (6) hours as needed for Congestion. 60 mg  
    
   
   
   
  
 rizatriptan 10 mg disintegrating tablet Commonly known as:  MAXALT-MLT Your last dose was: Your next dose is: Take 10 mg by mouth once as needed for Migraine. 10 mg  
    
   
   
   
  
 SINGULAIR 10 mg tablet Generic drug:  montelukast  
   
Your last dose was: Your next dose is: Take 10 mg by mouth nightly. 10 mg  
    
   
   
   
  
 SPIRIVA RESPIMAT 1.25 mcg/actuation inhaler Generic drug:  tiotropium bromide Your last dose was: Your next dose is:    
   
   
 INL 2 PUFFS PO ONCE Day * TOPAMAX 100 mg tablet Generic drug:  topiramate Your last dose was: Your next dose is: Take 100 mg by mouth daily (with breakfast). 100 mg  
    
   
   
   
  
 * TOPAMAX 100 mg tablet Generic drug:  topiramate Your last dose was: Your next dose is: Take 200 mg by mouth nightly. 200 mg  
    
   
   
   
  
 triamcinolone acetonide 0.5 % ointment Commonly known as:  KENALOG Your last dose was: Your next dose is: Apply  to affected area as needed for Skin Irritation. use thin layer  
     
   
   
   
  
 verapamil  mg CR capsule Commonly known as:  Rosia Quick Your last dose was: Your next dose is: Take 180 mg by mouth nightly. 180 mg  
    
   
   
   
  
 VITAMIN D3 PO Your last dose was: Your next dose is: Take 1 Cap by mouth daily. 1 Cap XOLAIR 150 mg Solr Generic drug:  omalizumab Your last dose was: Your next dose is:    
   
   
 every thirty (30) days. Pt does not know dose   Takes an injection every 30 days ZOFRAN (AS HYDROCHLORIDE) 4 mg tablet Generic drug:  ondansetron hcl Your last dose was: Your next dose is: Take 4 mg by mouth every eight (8) hours as needed for Nausea. 4 mg  
    
   
   
   
  
 zolpidem 10 mg tablet Commonly known as:  AMBIEN Your last dose was: Your next dose is: Take 1 Tab by mouth nightly as needed for Sleep. Max Daily Amount: 10 mg.  
 10 mg  
    
   
   
   
  
 * Notice: This list has 4 medication(s) that are the same as other medications prescribed for you. Read the directions carefully, and ask your doctor or other care provider to review them with you. Discharge Instructions Abdominal Hysterectomy: What to Expect at [facility] Your Recovery You can expect to feel better and stronger each day, although you may need pain medicine for a week or two. You may get tired easily or have less energy than usual. This may last for several weeks after surgery. You will probably notice that your belly is swollen and puffy. This is common. The swelling will take several weeks to go down. It may take about 4 to 6 weeks to fully recover.  
It is important to avoid lifting while you are recovering so that you can heal. 
This care sheet gives you a general idea about how long it will take for you to recover. But each person recovers at a different pace. Follow the steps below to get better as quickly as possible. How can you care for yourself at home? Activity ? · Rest when you feel tired. Getting enough sleep will help you recover. ? · Try to walk each day. Start by walking a little more than you did the day before. Bit by bit, increase the amount you walk. Walking boosts blood flow and helps prevent pneumonia and constipation. ? · Avoid lifting anything that would make you strain. This may include a child, heavy grocery bags and milk containers, a heavy briefcase or backpack, cat litter or dog food bags, or a vacuum . ? · Avoid strenuous activities, such as biking, jogging, weight lifting, or aerobic exercise, until your doctor says it is okay. ? · You may shower. Pat the cut (incision) dry. Do not take a bath for the first 2 weeks, or until your doctor tells you it is okay. ? · Ask your doctor when you can drive again. ? · You will probably need to take 2 to 4 weeks off from work. It depends on the type of work you do and how you feel. ? · Your doctor will tell you when you can have sex again. Diet ? · You can eat your normal diet. If your stomach is upset, try bland, low-fat foods like plain rice, broiled chicken, toast, and yogurt. ? · Drink plenty of fluids (unless your doctor tells you not to). ? · You may notice that your bowel movements are not regular right after your surgery. This is common. Try to avoid constipation and straining with bowel movements. You may want to take a fiber supplement every day. If you have not had a bowel movement after a couple of days, ask your doctor about taking a mild laxative. Medicines ? · Your doctor will tell you if and when you can restart your medicines. He or she will also give you instructions about taking any new medicines.   
? · If you take blood thinners, such as warfarin (Coumadin), clopidogrel (Plavix), or aspirin, be sure to talk to your doctor. He or she will tell you if and when to start taking those medicines again. Make sure that you understand exactly what your doctor wants you to do. ? · Be safe with medicines. Take pain medicines exactly as directed. ¨ If the doctor gave you a prescription medicine for pain, take it as prescribed. ¨ If you are not taking a prescription pain medicine, ask your doctor if you can take an over-the-counter medicine. ? · If your doctor prescribed antibiotics, take them as directed. Do not stop taking them just because you feel better. You need to take the full course of antibiotics. ? · If you think your pain medicine is making you sick to your stomach: 
¨ Take your medicine after meals (unless your doctor has told you not to). ¨ Ask your doctor for a different pain medicine. Incision care ? · If you have strips of tape on the cut (incision) the doctor made, leave the tape on for a week or until it falls off. Or follow your doctor's instructions for removing the tape. ? · Wash the area daily with warm, soapy water, and pat it dry. Don't use hydrogen peroxide or alcohol, which can slow healing. You may cover the area with a gauze bandage if it weeps or rubs against clothing. Change the bandage every day. ? · Keep the area clean and dry. Other instructions ? · You may have some light vaginal bleeding. Wear sanitary pads if needed. Do not douche or use tampons. Follow-up care is a key part of your treatment and safety. Be sure to make and go to all appointments, and call your doctor if you are having problems. It's also a good idea to know your test results and keep a list of the medicines you take. When should you call for help? Call 911 anytime you think you may need emergency care. For example, call if: 
? · You passed out (lost consciousness). ? · You have chest pain, are short of breath, or cough up blood. ?Call your doctor now or seek immediate medical care if: 
? · You have pain that does not get better after you take pain medicine. ? · You cannot pass stools or gas. ? · You have vaginal discharge that has increased in amount or smells bad.  
? · You are sick to your stomach or cannot drink fluids. ? · You have loose stitches, or your incision comes open. ? · Bright red blood has soaked through the bandage over your incision. ? · You have signs of infection, such as: 
¨ Increased pain, swelling, warmth, or redness. ¨ Red streaks leading from the incision. ¨ Pus draining from the incision. ¨ A fever. ? · You have bright red vaginal bleeding that soaks one or more pads in an hour, or you have large clots. ? · You have signs of a blood clot in your leg (called a deep vein thrombosis), such as: 
¨ Pain in your calf, back of the knee, thigh, or groin. ¨ Redness and swelling in your leg. ? Watch closely for changes in your health, and be sure to contact your doctor if you have any problems. Where can you learn more? Go to http://horacio-ary.info/. Enter M280 in the search box to learn more about \"Abdominal Hysterectomy: What to Expect at Home. \" Current as of: October 13, 2016 Content Version: 11.4 © 2889-5337 [employer]. Care instructions adapted under license by [employer] (which disclaims liability or warranty for this information). If you have questions about a medical condition or this instruction, always ask your healthcare professional. [address] any warranty or liability for your use of this information. Introducing [facility]! Lc Darby introduces [employer] patient portal. Now you can access parts of your medical record, email your doctor's office, and request medication refills online. 1. In your internet browser, go to [email or web address] 2. Click on the First Time User? Click Here link in the Sign In box. You will see the New Member Sign Up page. 3. Enter your [employer] Access Code exactly as it appears below. You will not need to use this code after youve completed the sign-up process. If you do not sign up before the expiration date, you must request a new code. · [employer] Access Code: [email or web address] Expires: 1/23/2018 11:34 AM 
 
4. Enter the last four digits of your Social Security Number (xxxx) and Date of Birth (mm/dd/yyyy) as indicated and click Submit. You will be taken to the next sign-up page. 5. Create a [employer] ID. This will be your [employer] login ID and cannot be changed, so think of one that is secure and easy to remember. 6. Create a [employer] password. You can change your password at any time. 7. Enter your Password Reset Question and Answer. This can be used at a later time if you forget your password. 8. Enter your e-mail address. You will receive e-mail notification when new information is available in [address] 9. Click Sign Up. You can now view and download portions of your medical record. 10. Click the Download Summary menu link to download a portable copy of your medical information. If you have questions, please visit the Frequently Asked Questions section of the [employer] website. Remember, [employer] is NOT to be used for urgent needs. For medical emergencies, dial 911. Now available from your iPhone and Android! Providers Seen During Your Hospitalization Provider Specialty Primary office phone Harriet García MD Gynecologic Oncology [phone] Your Primary Care Physician (PCP) Primary Care Physician Office Phone Office Fax Danika Vasquez [phone] You are allergic to the following Allergen Reactions Chocolate Flavor (Flavoring Agent) Anaphylaxis Flagyl (Metronidazole) Hives Itching Swelling Generalized itching, hives, eyes swelled Hazelnut (Tree Nut) Anaphylaxis [place] Anaphylaxis Other Medication Anaphylaxis NUTS Peanut Anaphylaxis Sunflower Oil Anaphylaxis Amlodipine Itching Eye swelling Celebrex (Celecoxib) Itching Clindamycin Itching Doxycycline Itching Hydrocodone Hives Itching Eye swelling Levofloxacin Hives Shortness of Breath Itching Lortab (Hydrocodone-Acetaminophen) Itching Mold Vertigo Pcn (Penicillins) Itching Patient tolerated graded Ancef challenge with out adverse side effects on 10/25/2017 Prednisone Itching Other (comments) Muscle spasms Tolerated medrol does packs Sesame Oil Hives Shortness of Breath Itching Shellfish Containing Products Itching Recent Documentation Weight BMI OB Status Smoking Status  
  
  
 119.7 kg 48.27 kg/m2 Having regular periods Current Every Day Smoker Emergency Contacts Name Discharge Info Relation Home Work Mobile Ree Perez DISCHARGE CAREGIVER [3] Other Relative [6] [phone] Mireille Obando DISCHARGE CAREGIVER [3] Other Relative [6] [phone] Rhianna Horn DISCHARGE CAREGIVER [3] Other Relative [6] [phone] Linda Perez DISCHARGE CAREGIVER [3] Other Relative [6] [phone] Patient Belongings The following personal items are in your possession at time of discharge: 
  Dental Appliances: None  Visual Aid: Glasses   Hearing Aids/Status: Does not own  Home Medications: None   Jewelry:  (ear piercing removed by patient in preop)  Clothing: Footwear, Jacket/Coat, Pants, Shirt, Undergarments    Other Valuables: Purse Please provide this summary of care documentation to your next provider. Signatures-by signing, you are acknowledging that this After Visit Summary has been reviewed with you and you have received a copy. Patient Signature:  ____________________________________________________________ Date:  ____________________________________________________________  
  
Azucena Mirna Provider Signature:  ____________________________________________________________ Date:  ____________________________________________________________

## 2017-10-25 NOTE — ANESTHESIA POSTPROCEDURE EVALUATION
Post-Anesthesia Evaluation and Assessment    Patient: Warden Vitale MRN: [id number]  SSN: [id number]    YOB: 1970  Age: 52 y.o. Sex: female       Cardiovascular Function/Vital Signs  Visit Vitals    /75    Pulse 64    Temp (P) 36.6 °C (97.8 °F)    Resp 15    SpO2 100%       Patient is status post general anesthesia for Procedure(s):  DAVINCI LAPROSCOPIC TOTAL HYSTERECTOMY, BILATERAL SALPINGOECTOMY. Nausea/Vomiting: None    Postoperative hydration reviewed and adequate. Pain:  Pain Scale 1: (P) Numeric (0 - 10) (10/25/17 1740)  Pain Intensity 1: (P) 2 (10/25/17 1740)   Managed    Neurological Status:   Neuro (WDL): Within Defined Limits (10/25/17 1706)  Neuro  LUE Motor Response: Purposeful (10/25/17 1706)  LLE Motor Response: Purposeful (10/25/17 1706)  RUE Motor Response: Purposeful (10/25/17 1706)  RLE Motor Response: Purposeful (10/25/17 1706)   At baseline    Mental Status and Level of Consciousness: Arousable    Pulmonary Status:   O2 Device: (P) Nasal cannula (10/25/17 1740)   Adequate oxygenation and airway patent    Complications related to anesthesia: None    Post-anesthesia assessment completed.  No concerns    Signed By: Ed Wiley MD     October 25, 2017

## 2017-10-25 NOTE — ANESTHESIA PREPROCEDURE EVALUATION
Anesthetic History   No history of anesthetic complications            Review of Systems / Medical History  Patient summary reviewed, nursing notes reviewed and pertinent labs reviewed    Pulmonary        Sleep apnea    Asthma        Neuro/Psych         Psychiatric history     Cardiovascular                Pertinent negatives: Hypertension: denies   Exercise tolerance: >4 METS  Comments: Not on beta blocker   GI/Hepatic/Renal     GERD: well controlled           Endo/Other        Morbid obesity and arthritis     Other Findings            Physical Exam    Airway  Mallampati: I  TM Distance: 4 - 6 cm  Neck ROM: normal range of motion   Mouth opening: Normal    Comments: Narrow palate Cardiovascular  Regular rate and rhythm,  S1 and S2 normal,  no murmur, click, rub, or gallop  Rhythm: regular  Rate: normal         Dental  No notable dental hx       Pulmonary  Breath sounds clear to auscultation               Abdominal  GI exam deferred       Other Findings            Anesthetic Plan    ASA: 3  Anesthesia type: general          Induction: Intravenous  Anesthetic plan and risks discussed with: Patient

## 2017-10-25 NOTE — BRIEF OP NOTE
BRIEF OPERATIVE NOTE    Date of Procedure: 10/25/2017   Preoperative Diagnosis: ENDOMETRIOSIS  Postoperative Diagnosis: ENDOMETRIOSIS    Procedure(s):  DAVINCI LAPROSCOPIC TOTAL HYSTERECTOMY, BILATERAL SALPINGO POSSIBLE OOPHORECTOMY  Surgeon(s) and Role:     * Toño Briscoe MD - Primary         Assistant Staff:       Surgical Staff:  Circ-1: Kendall Johnson RN  Circ-2: Nghia Crockett RN  Scrub Tech-1: Nolvia Led  Surg Asst-1: Kalina Acuna LPN; Mima Menchaca  Flocrescencio Staff: Elvia Oden RN;  Day Agrawal  Event Time In   Incision Start 1456   Incision Close      Anesthesia: General   Estimated Blood Loss: 10cc  Specimens:   ID Type Source Tests Collected by Time Destination   1 : Uterus,cervix, fallopian tubes    Toño Briscoe MD 10/25/2017 1896 Pathology      Findings: 12 week fibroid uterus; endometriosis with adhesions/fibrosis   Complications: none  Implants: * No implants in log *

## 2017-10-25 NOTE — ROUTINE PROCESS
Bedside and Verbal shift change report given to Maryan Whiting 69 (oncoming nurse) by Lili QUINTERO (offgoing nurse). Report included the following information SBAR, Kardex, Intake/Output and Recent Results.

## 2017-10-25 NOTE — PROGRESS NOTES
Primary Nurse Mayra Vera RN and Bhargav Flores RN performed a dual skin assessment on this patient. Impairment noted (surgical sites)- see wound doc flow sheet. Zacarias score is 23.

## 2017-10-25 NOTE — PERIOP NOTES
TRANSFER - OUT REPORT:    Verbal report given to guera rn(name) on Mark Rea  being transferred to [facility](unit) for routine post - op       Report consisted of patients Situation, Background, Assessment and   Recommendations(SBAR). Information from the following report(s) SBAR, Procedure Summary, Intake/Output and MAR was reviewed with the receiving nurse. Lines:   Peripheral IV 10/25/17 Right; Inner;Mid Forearm (Active)   Site Assessment Clean, dry, & intact 10/25/2017  6:27 PM   Phlebitis Assessment 0 10/25/2017  6:27 PM   Infiltration Assessment 0 10/25/2017  6:27 PM   Dressing Status Clean, dry, & intact 10/25/2017  6:27 PM   Dressing Type Transparent 10/25/2017  6:27 PM   Hub Color/Line Status Pink 10/25/2017  6:27 PM   Action Taken Open ports on tubing capped 10/25/2017  6:27 PM   Alcohol Cap Used Yes 10/25/2017  6:27 PM       Peripheral IV 10/25/17 Left Hand (Active)   Site Assessment Clean, dry, & intact 10/25/2017  6:27 PM   Phlebitis Assessment 0 10/25/2017  6:27 PM   Infiltration Assessment 0 10/25/2017  6:27 PM   Dressing Status Clean, dry, & intact 10/25/2017  6:27 PM   Dressing Type Transparent 10/25/2017  6:27 PM   Hub Color/Line Status Pink 10/25/2017  6:27 PM   Action Taken Open ports on tubing capped 10/25/2017  6:27 PM   Alcohol Cap Used Yes 10/25/2017  6:27 PM        Opportunity for questions and clarification was provided.       Patient transported with:   Registered Nurse

## 2017-10-25 NOTE — PROGRESS NOTES
[facility] Dosing Services: 10/25/2017      Consult made for this 52 y.o. female for enoxaparin DVT proph dosing   Wt Readings from Last 1 Encounters:   10/25/17 119.7 kg (263 lb 14.3 oz)       Ht Readings from Last 1 Encounters:   10/17/17 157.5 cm (62\")        Previous Dose 40 mg sq q24h   Creatinine Clearance Estimated Creatinine Clearance: 101.8 mL/min (based on Cr of 0.84). Creatinine Lab Results   Component Value Date/Time    Creatinine 0.84 10/16/2017 11:15 AM       Platelet Lab Results   Component Value Date/Time    PLATELET 649 54/69/7187 11:15 AM      H/H Lab Results   Component Value Date/Time    HGB 13.3 10/16/2017 11:15 AM         Epidural Catheter? no    Pharmacist made change to enoxaparin therapy based on:  [ X ] BMI: dose changed to: 40 mg sq q12h    - Pharmacy to automatically make dose adjustment for renal dysfunction (creatinine clearance less than 30 mL/min)  - Pharmacy to automatically make dose adjustment for obesity (BMI greater than 40)  - Pharmacy to make dose rounding adjustments per [facility] dose adjustment scale. Pharmacy to monitor patients progress. Will make dose adjustment as needed per changing renal function. Will communicate further recommendations regarding patients anticoagulation therapy with prescriber. Signed Terrell Alexander RPH .  Contact information: [phone]

## 2017-10-26 VITALS
HEART RATE: 77 BPM | TEMPERATURE: 98.6 F | SYSTOLIC BLOOD PRESSURE: 134 MMHG | WEIGHT: 263.89 LBS | BODY MASS INDEX: 48.27 KG/M2 | RESPIRATION RATE: 18 BRPM | OXYGEN SATURATION: 97 % | DIASTOLIC BLOOD PRESSURE: 77 MMHG

## 2017-10-26 LAB
ALBUMIN SERPL-MCNC: 2.6 G/DL (ref 3.5–5)
ALBUMIN/GLOB SERPL: 0.8 {RATIO} (ref 1.1–2.2)
ALP SERPL-CCNC: 63 U/L (ref 45–117)
ALT SERPL-CCNC: 14 U/L (ref 12–78)
ANION GAP SERPL CALC-SCNC: 9 MMOL/L (ref 5–15)
AST SERPL-CCNC: 10 U/L (ref 15–37)
BILIRUB SERPL-MCNC: 0.3 MG/DL (ref 0.2–1)
BUN SERPL-MCNC: 6 MG/DL (ref 6–20)
BUN/CREAT SERPL: 9 (ref 12–20)
CALCIUM SERPL-MCNC: 7.9 MG/DL (ref 8.5–10.1)
CHLORIDE SERPL-SCNC: 107 MMOL/L (ref 97–108)
CO2 SERPL-SCNC: 23 MMOL/L (ref 21–32)
CREAT SERPL-MCNC: 0.69 MG/DL (ref 0.55–1.02)
ERYTHROCYTE [DISTWIDTH] IN BLOOD BY AUTOMATED COUNT: 15.3 % (ref 11.5–14.5)
GLOBULIN SER CALC-MCNC: 3.4 G/DL (ref 2–4)
GLUCOSE SERPL-MCNC: 118 MG/DL (ref 65–100)
HCT VFR BLD AUTO: 35.8 % (ref 35–47)
HGB BLD-MCNC: 12.5 G/DL (ref 11.5–16)
MCH RBC QN AUTO: 27.3 PG (ref 26–34)
MCHC RBC AUTO-ENTMCNC: 34.9 G/DL (ref 30–36.5)
MCV RBC AUTO: 78.2 FL (ref 80–99)
PLATELET # BLD AUTO: 330 K/UL (ref 150–400)
POTASSIUM SERPL-SCNC: 3.2 MMOL/L (ref 3.5–5.1)
PROT SERPL-MCNC: 6 G/DL (ref 6.4–8.2)
RBC # BLD AUTO: 4.58 M/UL (ref 3.8–5.2)
SODIUM SERPL-SCNC: 139 MMOL/L (ref 136–145)
WBC # BLD AUTO: 16.4 K/UL (ref 3.6–11)

## 2017-10-26 PROCEDURE — 36415 COLL VENOUS BLD VENIPUNCTURE: CPT | Performed by: OBSTETRICS & GYNECOLOGY

## 2017-10-26 PROCEDURE — 85027 COMPLETE CBC AUTOMATED: CPT | Performed by: OBSTETRICS & GYNECOLOGY

## 2017-10-26 PROCEDURE — 94640 AIRWAY INHALATION TREATMENT: CPT

## 2017-10-26 PROCEDURE — 77010033678 HC OXYGEN DAILY

## 2017-10-26 PROCEDURE — 74011000250 HC RX REV CODE- 250: Performed by: OBSTETRICS & GYNECOLOGY

## 2017-10-26 PROCEDURE — 80053 COMPREHEN METABOLIC PANEL: CPT | Performed by: OBSTETRICS & GYNECOLOGY

## 2017-10-26 PROCEDURE — 74011250637 HC RX REV CODE- 250/637: Performed by: OBSTETRICS & GYNECOLOGY

## 2017-10-26 PROCEDURE — 99218 HC RM OBSERVATION: CPT

## 2017-10-26 PROCEDURE — 74011250636 HC RX REV CODE- 250/636: Performed by: OBSTETRICS & GYNECOLOGY

## 2017-10-26 RX ORDER — IPRATROPIUM BROMIDE AND ALBUTEROL SULFATE 2.5; .5 MG/3ML; MG/3ML
3 SOLUTION RESPIRATORY (INHALATION)
Status: COMPLETED | OUTPATIENT
Start: 2017-10-26 | End: 2017-10-26

## 2017-10-26 RX ADMIN — OXYCODONE HYDROCHLORIDE AND ACETAMINOPHEN 1 TABLET: 5; 325 TABLET ORAL at 05:12

## 2017-10-26 RX ADMIN — Medication 10 ML: at 05:13

## 2017-10-26 RX ADMIN — IPRATROPIUM BROMIDE AND ALBUTEROL SULFATE 3 ML: .5; 3 SOLUTION RESPIRATORY (INHALATION) at 10:29

## 2017-10-26 RX ADMIN — ENOXAPARIN SODIUM 40 MG: 40 INJECTION SUBCUTANEOUS at 05:12

## 2017-10-26 NOTE — PROGRESS NOTES
Subjective  NAEON per pt, RN  Pain adequately controlled with PO  Unsure if passed flatus, denies n/v  Would like to try breakfast    Temp:  [97.5 °F (36.4 °C)-98.8 °F (37.1 °C)]   Pulse (Heart Rate):  [59-85]   BP: (124-154)/(58-85)   Resp Rate:  [14-19]   O2 Sat (%):  [95 %-100 %]   Weight:  [119.7 kg (263 lb 14.3 oz)]   10/25 1901 - 10/26 0700  In: -   Out: 1200 [Urine:1200]  10/24 0701 - 10/25 1900  In: 2300 [I.V.:2300]  Out: 325 [Urine:300]      Objective    Visit Vitals    /71 (BP 1 Location: Left arm, BP Patient Position: At rest)    Pulse 68    Temp 98.8 °F (37.1 °C)    Resp 19    Wt 119.7 kg (263 lb 14.3 oz)    LMP 10/02/2017    SpO2 97%    BMI 48.27 kg/m2     General:  Alert, cooperative, no distress. Head:  Normocephalic, without obvious abnormality, atraumatic. Lungs:   Clear to auscultation bilaterally. Chest wall:  No tenderness or deformity. Heart:  Regular rate and rhythm, S1, S2 normal, no murmur, click, rub, or gallop. Abdomen:   Soft, mild appropriate TTP. Bowel sounds normal. Incisions c/d/i with dressings in place   Extremities: Extremities normal, atraumatic, no cyanosis or edema. Pulses: 2+ and symmetric all extremities. Skin: Skin color, texture, turgor normal. No rashes or lesions. Active Problems:    Endometriosis (10/25/2017)          Assessment & Plan    48yo POD1 s/p robotic-assisted TLH/BS for symptomatic AUB-L with intraoperative findings c/w endometriosis; doing well    Postoperative care  - fully advance this AM, dc peters, full diet, encourage ambulation and IS at bedside  - anticipate dc home later today  - no labs this AM, VSS/afebrile, EBL minimal     Comorbidities  - class 3 obesity, increase use of IS at bedside, dc nasal cannula O2; pt has no O2 requirement at baseline and appropriate sats o/n without    Dispo: pending clinical progress, anticipate dc home later today pending tolerance of full PO.   Rx on chart    Pt to be seen and rounded on with attending, Dr. Matthew Brandt MD  OBGYN PGY4

## 2017-10-26 NOTE — PROGRESS NOTES
Discharge teaching completed. Pt and family verbalized understanding of discharge instructions. Prescription given to patient. Pt discharged home via wheelchair, accompanied by significant other and volunteer.

## 2017-10-26 NOTE — PROGRESS NOTES
.10/26/17 11:30 AM  I met with patient who verified her address and phone and perscription coverage. She uses [employer] on [address]. She live alone in and has no stairs but 4 entry steps. I met her Sister Cruz Bonilla who is here visiting from [place] [address], she stated the patient has several family members including her mother, grandmother and cousin who are available for support at [facility]. She has no further discharge needs at this time. Care Management Interventions  PCP Verified by CM: Yes  Mode of Transport at Discharge: Self (family )  Physical Therapy Consult: No  Occupational Therapy Consult: No  Speech Therapy Consult: No  Current Support Network: Lives Alone  Confirm Follow Up Transport: Family  Plan discussed with Pt/Family/Caregiver:  Yes     Paolo Gordon RN

## 2017-10-26 NOTE — PROGRESS NOTES
Spiritual Care Partner Volunteer visited patient in Med Surg unit on 10/26/17.   Documented by:  Tennille Lamar [address] [facility] ([id number])

## 2017-10-26 NOTE — OP NOTES
[facility]   [facility], [address]   OP NOTE       Name:  Carlos Pichardo   MR#:  [id number]   :  1970   Account #:  [de-identified]    Surgery Date:  10/25/2017   Date of Adm:  10/25/2017       DATE OF OPERATION: 10/25/2017. PREOPERATIVE DIAGNOSIS:   1. Endometriosis. 2. Pelvic pain. 3. Menorrhagia. POSTOPERATIVE DIAGNOSIS:   1. Endometriosis. 2. Pelvic pain. 3. Menorrhagia. PROCEDURES PERFORMED:   1. Laparoscopic total hysterectomy. 2. Bilateral salpingectomy. 3. Lysis of adhesions with the [employer] robot. SURGEON: Stephanie Doyle MD.    ASSISTANT:     ANESTHESIA: General.    ESTIMATED BLOOD LOSS: 10 mL. SPECIMENS REMOVED: uterus and tubes    COMPLICATIONS: None. FINDINGS: The patient had a 10- to 12-week size fibroid uterus with   endometriosis. The ovaries were adherent to the posterior uterus and   toward the uterosacral ligaments, where there was noted to be fibrosis   and old endometriosis. Per the patient's wishes, the ovaries were left in   place, even after discussion that removing the ovaries would be   definitive treatment for her severe endometriosis. The patient stated   that she adamantly wanted to keep the ovaries. DESCRIPTION OF PROCEDURE: The patient was taken to the   operating room. After induction of good general anesthetic, she was   placed in [place] [address] in ski position and prepped and draped in   the usual fashion for laparoscopic surgery. A Jane catheter was   placed and a VCare uterine manipulator was placed in the endometrial   cavity. Attention was turned to the abdomen where an incision was made 23   cm superior to the pubic symphysis in the midline. The Veress needle   was introduced into the abdominal cavity. The abdomen was   insufflated with 3 liters of CO2. An 8 mm trocar was passed through   the incision. The laparoscope was passed through the trocar sleeve.    Two 8-mm trocars were then placed 10 cm inferolateral to the midline   trocar, 1 to the left, 1 to the right. Another 8-mm trocar was placed in   the left flank. An 8-mm AirSeal trocar was placed in the right flank as   an assistant's port. The [employer] robot was brought into the operative   field and docked to the patient. The above findings were noted. We began on the right side, where the round ligament was coagulated   with the bipolar and transected with the monopolar scissors. The right   tube and ovary were densely adherent to the posterior uterus and at   the cul-de-sac there was a large fibroid in the right broad ligament. We   were able to identify the utero-ovarian ligament and tube, which were   coagulated with the bipolar and transected with the monopolar   scissors. We came across these two structures and dissected them   free from the posterior aspect of the uterus. The vesicouterine   peritoneum was then incised and the bladder was taken down with   cautery and sharp dissection. The uterine vessels were then   skeletonized by placing hard traction of this large broad ligament   fibroid and tracking it to the left. The ureter was identified and traced. It   was noted to be out of harm's way. This was traced retroperitoneally. The uterine vessels were then skeletonized and coagulated with the   bipolar and transected with the monopolar scissors. The cardinal   ligament was coagulated with the bipolar and transected with the   monopolar scissors. The uterosacral ligament was coagulated with the   bipolar and transected with the monopolar scissors. We turned our attention to the left side where the tube and ovary were   again adherent to the posterior uterus and the uterosacral ligament. These were freed up with cautery and sharp dissection using the   monopolar scissors. Once freed, the utero-ovarian ligament and tube   were coagulated with the bipolar and transected with the monopolar   scissors.  The round ligament was coagulated with the bipolar and   transected with the monopolar scissors. The posterior peritoneum was incised down to the uterosacral   ligament. The vesicouterine peritoneum was incised, and the bladder   was taken down further on the left side. The uterine vessels were then   skeletonized, coagulated with the bipolar and transected with   monopolar scissors. The cardinal ligament was coagulated with the   bipolar and transected with the monopolar scissors. The uterosacral   ligament was coagulated with the bipolar and transected with the   monopolar scissors. Colpotomy was then done and completed circumferentially along the   VCare cup. The uterus was too large to be removed via the vagina at   this point. We were able to cut fibroids free and core out the uterus and   remove it via the vagina. We then took the bladder down a bit further. The vaginal cuff was then closed with 2 running 2-0 Stratafix sutures,   running one from each angle and finishing in the mid portion. We then   removed the tubes by coagulating the mesosalpinx with the bipolar and   transecting with the monopolar scissors, removing the tubes in their   entirety bilaterally, and removing them from the abdomen via the   assistant's port. The ovaries were then dissected free from the   uterosacral ligaments still further and from the pelvic sidewalls bringing   them up higher in the pelvis. We then assured hemostasis in all areas. All instruments were   removed. The 8-mm air seal port site was closed with the Weck fascial   closure device using 0 Vicryl. The trocars and CO2 were removed and   the skin incisions were closed with 4-0 subcuticular Monocryl. Sterile   dressing was applied. The patient was awakened and taken to   recovery in good condition. There were no complications.         MD Samra Brennan / Brigid Trammell   D:  10/25/2017   16:49   T:  10/26/2017   05:17   Job #:  [id number]

## 2017-10-26 NOTE — PROGRESS NOTES
Nutrition Assessment:    RECOMMENDATIONS/INTERVENTION(S):   Continue Regular diet  Monitor PO Intakes, wt, BG,   Monitor need for ONS without allergens. ASSESSMENT:   10/26: 52 yr old admitted for hysterectomy. PMHx: HTN, asthma, food allergies. Continue regular diet with allergies in system. Pt seen for 15 lbs wt loss over the last 2 months. Pt states appetite has been poor. <25% of breakfast eaten this morning. Pt has treenut/shellfish allergies. Pt awaiting urination for discharge per daughter. Discussed ONS that might not have sunflower seed oil or other allergic ingredients for home if appetite continues to be poor. A1c 5.6 K+ 3.2. SUBJECTIVE/OBJECTIVE:   Diet Order:  Regular  % Eaten:  No data found. Pertinent Medications: [x] Reviewed    Chemistries:  Lab Results   Component Value Date/Time    Sodium 139 10/26/2017 05:02 AM    Potassium 3.2 10/26/2017 05:02 AM    Chloride 107 10/26/2017 05:02 AM    CO2 23 10/26/2017 05:02 AM    Anion gap 9 10/26/2017 05:02 AM    Glucose 118 10/26/2017 05:02 AM    BUN 6 10/26/2017 05:02 AM    Creatinine 0.69 10/26/2017 05:02 AM    BUN/Creatinine ratio 9 10/26/2017 05:02 AM    GFR est AA >60 10/26/2017 05:02 AM    GFR est non-AA >60 10/26/2017 05:02 AM    Calcium 7.9 10/26/2017 05:02 AM    AST (SGOT) 10 10/26/2017 05:02 AM    Alk. phosphatase 63 10/26/2017 05:02 AM    Protein, total 6.0 10/26/2017 05:02 AM    Albumin 2.6 10/26/2017 05:02 AM    Globulin 3.4 10/26/2017 05:02 AM    A-G Ratio 0.8 10/26/2017 05:02 AM    ALT (SGPT) 14 10/26/2017 05:02 AM      Anthropometrics: Height:   Weight: 119.7 kg (263 lb 14.3 oz)    IBW (%IBW):   ( ) UBW (%UBW):   (  %)    BMI: Body mass index is 48.27 kg/(m^2). This BMI is indicative of:   [] Underweight    [] Normal    [] Overweight    []  Obesity    [x]  Extreme Obesity (BMI>40)  Estimated Nutrition Needs (Based on): 2071 Kcals/day (BMR(1785x1.3) -250) , 96 g (-120g/day(0.8-1.0g/kg)) Protein  Carbohydrate:  At Least 130 g/day Fluids: 2050 mL/day    Last BM: 10/25  []Active     []Hyperactive  [x]Hypoactive       [] Absent   BS  Skin:    [] Intact   [] Incision  [] Breakdown   [] DTI   [] Tears/Excoriation/Abrasion  []Edema [] Other:    Wt Readings from Last 30 Encounters:   10/25/17 119.7 kg (263 lb 14.3 oz)   10/17/17 118.8 kg (262 lb)   10/16/17 119.7 kg (263 lb 14.3 oz)   09/11/17 122.9 kg (271 lb)   08/14/17 120.6 kg (265 lb 12.8 oz)   08/11/17 121.1 kg (267 lb)   03/23/17 121.6 kg (268 lb)   03/15/17 122.1 kg (269 lb 1.6 oz)   02/22/17 122 kg (269 lb)   01/23/17 125.2 kg (276 lb)   12/09/16 124.3 kg (274 lb)   08/02/16 103.4 kg (228 lb)   06/15/16 124.9 kg (275 lb 6.4 oz)   03/09/16 120.9 kg (266 lb 8 oz)   01/28/16 122 kg (269 lb)   11/17/15 122.5 kg (270 lb)   11/03/15 124.7 kg (275 lb)   09/17/15 124.1 kg (273 lb 11.2 oz)   08/26/15 123.4 kg (272 lb)   07/16/15 124.7 kg (275 lb)   06/30/15 125.2 kg (276 lb)   06/24/15 127.5 kg (281 lb)   09/12/14 133.8 kg (295 lb)   12/06/12 130.2 kg (287 lb)   05/02/12 127 kg (280 lb)      NUTRITION DIAGNOSES:   Problem:  Inadequate energy intake     Etiology: related to decreased ability to consume sufficient energy     Signs/Symptoms: as evidenced by Poor PO intakes, wt loss, low appetite      NUTRITION INTERVENTIONS:  Meals/Snacks: General/healthful diet                  GOAL:   Pt will consume >50% of meals within 3-5 days    Cultural, Roman Catholic, or Ethnic Dietary Needs:       LEARNING NEEDS (Diet, Food/Nutrient-Drug Interaction):    [x] None Identified   [] Identified and Education Provided/Documented   [] Identified and Pt declined/was not appropriate      [x] Interdisciplinary Care Plan Reviewed/Documented    [x] Discharge Needs:TBD    [] No Nutrition Related Discharge Needs    NUTRITION RISK:   Pt Is At Nutrition Risk  [x]     No Nutrition Risk Identified  []       PT SEEN FOR:    []  MD Consult: []Calorie Count      []Diabetic Diet Education        []Diet Education     []Electrolyte Management     []General Nutrition Management and Supplements     []Management of Tube Feeding     []TPN Recommendations    [x]  RN Referral:  [x]MST score >=2     []Enteral/Parenteral Nutrition PTA     []Pregnant: Gestational DM or Multigestation                 [] Pressure Ulcer      []  Low BMI      []  Length of Stay       [] Dysphagia Diet     [] Ventilator      [] Follow-Up        Previous Recommendations:   [] Implemented          [] Not Implemented          [x] Not Applicable    Previous Goal:   [] Met              [] Progressing Towards Goal              [] Not Progressing Towards Goal   [x] Not Applicable              Darlyn Blair, [address] [place] [address]   Pager [phone]

## 2017-10-26 NOTE — DISCHARGE INSTRUCTIONS
Abdominal Hysterectomy: What to Expect at [address] [place] [address] can expect to feel better and stronger each day, although you may need pain medicine for a week or two. You may get tired easily or have less energy than usual. This may last for several weeks after surgery. You will probably notice that your belly is swollen and puffy. This is common. The swelling will take several weeks to go down. It may take about 4 to 6 weeks to fully recover. It is important to avoid lifting while you are recovering so that you can heal.  This care sheet gives you a general idea about how long it will take for you to recover. But each person recovers at a different pace. Follow the steps below to get better as quickly as possible. How can you care for yourself at home? Activity  ? · Rest when you feel tired. Getting enough sleep will help you recover. ? · Try to walk each day. Start by walking a little more than you did the day before. Bit by bit, increase the amount you walk. Walking boosts blood flow and helps prevent pneumonia and constipation. ? · Avoid lifting anything that would make you strain. This may include a child, heavy grocery bags and milk containers, a heavy briefcase or backpack, cat litter or dog food bags, or a vacuum . ? · Avoid strenuous activities, such as biking, jogging, weight lifting, or aerobic exercise, until your doctor says it is okay. ? · You may shower. Pat the cut (incision) dry. Do not take a bath for the first 2 weeks, or until your doctor tells you it is okay. ? · Ask your doctor when you can drive again. ? · You will probably need to take 2 to 4 weeks off from work. It depends on the type of work you do and how you feel. ? · Your doctor will tell you when you can have sex again. Diet  ? · You can eat your normal diet. If your stomach is upset, try bland, low-fat foods like plain rice, broiled chicken, toast, and yogurt.    ? · Drink plenty of fluids (unless your doctor tells you not to). ? · You may notice that your bowel movements are not regular right after your surgery. This is common. Try to avoid constipation and straining with bowel movements. You may want to take a fiber supplement every day. If you have not had a bowel movement after a couple of days, ask your doctor about taking a mild laxative. Medicines  ? · Your doctor will tell you if and when you can restart your medicines. He or she will also give you instructions about taking any new medicines. ? · If you take blood thinners, such as warfarin (Coumadin), clopidogrel (Plavix), or aspirin, be sure to talk to your doctor. He or she will tell you if and when to start taking those medicines again. Make sure that you understand exactly what your doctor wants you to do. ? · Be safe with medicines. Take pain medicines exactly as directed. ¨ If the doctor gave you a prescription medicine for pain, take it as prescribed. ¨ If you are not taking a prescription pain medicine, ask your doctor if you can take an over-the-counter medicine. ? · If your doctor prescribed antibiotics, take them as directed. Do not stop taking them just because you feel better. You need to take the full course of antibiotics. ? · If you think your pain medicine is making you sick to your stomach:  ¨ Take your medicine after meals (unless your doctor has told you not to). ¨ Ask your doctor for a different pain medicine. Incision care  ? · If you have strips of tape on the cut (incision) the doctor made, leave the tape on for a week or until it falls off. Or follow your doctor's instructions for removing the tape. ? · Wash the area daily with warm, soapy water, and pat it dry. Don't use hydrogen peroxide or alcohol, which can slow healing. You may cover the area with a gauze bandage if it weeps or rubs against clothing. Change the bandage every day. ? · Keep the area clean and dry. Other instructions  ?  · You may have some light vaginal bleeding. Wear sanitary pads if needed. Do not douche or use tampons. Follow-up care is a key part of your treatment and safety. Be sure to make and go to all appointments, and call your doctor if you are having problems. It's also a good idea to know your test results and keep a list of the medicines you take. When should you call for help? Call 911 anytime you think you may need emergency care. For example, call if:  ? · You passed out (lost consciousness). ? · You have chest pain, are short of breath, or cough up blood. ?Call your doctor now or seek immediate medical care if:  ? · You have pain that does not get better after you take pain medicine. ? · You cannot pass stools or gas. ? · You have vaginal discharge that has increased in amount or smells bad.   ? · You are sick to your stomach or cannot drink fluids. ? · You have loose stitches, or your incision comes open. ? · Bright red blood has soaked through the bandage over your incision. ? · You have signs of infection, such as:  ¨ Increased pain, swelling, warmth, or redness. ¨ Red streaks leading from the incision. ¨ Pus draining from the incision. ¨ A fever. ? · You have bright red vaginal bleeding that soaks one or more pads in an hour, or you have large clots. ? · You have signs of a blood clot in your leg (called a deep vein thrombosis), such as:  ¨ Pain in your calf, back of the knee, thigh, or groin. ¨ Redness and swelling in your leg. ? Watch closely for changes in your health, and be sure to contact your doctor if you have any problems. Where can you learn more? Go to http://horacio-ary.info/. Enter M280 in the search box to learn more about \"Abdominal Hysterectomy: What to Expect at Home. \"  Current as of: October 13, 2016  Content Version: 11.4  © 3129-8033 [employer].  Care instructions adapted under license by [employer] (which disclaims liability or warranty for this information). If you have questions about a medical condition or this instruction, always ask your healthcare professional. [place] [address] any warranty or liability for your use of this information.

## 2017-10-26 NOTE — PROGRESS NOTES
Problem: Falls - Risk of  Goal: *Absence of Falls  Document Constantino Fall Risk and appropriate interventions in the flowsheet.    Outcome: Progressing Towards Goal  Fall Risk Interventions:  Mobility Interventions: Bed/chair exit alarm, Communicate number of staff needed for ambulation/transfer         Medication Interventions: Bed/chair exit alarm, Evaluate medications/consider consulting pharmacy, Patient to call before getting OOB, Teach patient to arise slowly    Elimination Interventions: Bed/chair exit alarm, Call light in reach, Patient to call for help with toileting needs, Toilet paper/wipes in reach, Toileting schedule/hourly rounds    History of Falls Interventions: Bed/chair exit alarm, Door open when patient unattended, Evaluate medications/consider consulting pharmacy

## 2017-12-11 RX ORDER — ESOMEPRAZOLE MAGNESIUM 40 MG/1
CAPSULE, DELAYED RELEASE ORAL
Qty: 90 CAP | Refills: 0 | Status: SHIPPED | OUTPATIENT
Start: 2017-12-11 | End: 2018-03-28 | Stop reason: SDUPTHER

## 2018-02-26 RX ORDER — CITALOPRAM 20 MG/1
TABLET, FILM COATED ORAL
Qty: 90 TAB | Refills: 0 | Status: SHIPPED | OUTPATIENT
Start: 2018-02-26 | End: 2018-06-04 | Stop reason: SDUPTHER

## 2018-02-26 RX ORDER — HYDROCHLOROTHIAZIDE 25 MG/1
TABLET ORAL
Qty: 90 TAB | Refills: 0 | Status: SHIPPED | OUTPATIENT
Start: 2018-02-26 | End: 2018-06-04 | Stop reason: SDUPTHER

## 2018-03-28 RX ORDER — ESOMEPRAZOLE MAGNESIUM 40 MG/1
CAPSULE, DELAYED RELEASE ORAL
Qty: 90 CAP | Refills: 3 | Status: SHIPPED | OUTPATIENT
Start: 2018-03-28 | End: 2019-04-19

## 2018-05-21 ENCOUNTER — OFFICE VISIT (OUTPATIENT)
Dept: FAMILY MEDICINE CLINIC | Age: 48
End: 2018-05-21

## 2018-05-21 VITALS
BODY MASS INDEX: 46.56 KG/M2 | RESPIRATION RATE: 20 BRPM | HEART RATE: 95 BPM | OXYGEN SATURATION: 98 % | SYSTOLIC BLOOD PRESSURE: 119 MMHG | DIASTOLIC BLOOD PRESSURE: 73 MMHG | HEIGHT: 62 IN | TEMPERATURE: 98.3 F | WEIGHT: 253 LBS

## 2018-05-21 DIAGNOSIS — I10 ESSENTIAL HYPERTENSION: Primary | ICD-10-CM

## 2018-05-21 DIAGNOSIS — E66.01 OBESITY, MORBID (HCC): ICD-10-CM

## 2018-05-21 DIAGNOSIS — G43.109 MIGRAINE WITH VERTIGO: ICD-10-CM

## 2018-05-21 PROBLEM — R42 VERTIGO: Status: ACTIVE | Noted: 2018-05-21

## 2018-05-21 NOTE — PROGRESS NOTES
Chief Complaint   Patient presents with    Fatigue    Documentation    Dizziness     Positional Vertigo w/migrains     Pt seen in the office today for c/o of fatigue and dizziness. She states she is being followed by neurology for positional vertigo  Pt is requesting blood work today    Pt reports that she was seeing Dr. Cynthia Rubi, is now seeing Dr. Chasidy Anglin. Pt reports that she has been out of work since last Thursday, sees a new neurologist on the 29th. Subjective: (As above and below)     Chief Complaint   Patient presents with    Fatigue    Documentation    Dizziness     Positional Vertigo w/migrains     she is a 50y.o. year old female who presents for evaluation. Reviewed PmHx, RxHx, FmHx, SocHx, AllgHx and updated in chart. Review of Systems - negative except as listed above    Objective:     Vitals:    05/21/18 1427   BP: 119/73   Pulse: 95   Resp: 20   Temp: 98.3 °F (36.8 °C)   TempSrc: Oral   SpO2: 98%   Weight: 253 lb (114.8 kg)   Height: 5' 2\" (1.575 m)     Physical Examination: General appearance - alert, well appearing, and in no distress  Mental status - normal mood, behavior, speech, dress, motor activity, and thought processes  Mouth - mucous membranes moist, pharynx normal without lesions  Chest - clear to auscultation, no wheezes, rales or rhonchi, symmetric air entry  Heart - normal rate, regular rhythm, normal S1, S2, no murmurs, rubs, clicks or gallops  Musculoskeletal - no joint tenderness, deformity or swelling  Extremities - peripheral pulses normal, no pedal edema, no clubbing or cyanosis  Pt feel dizzy with position changes  Assessment/ Plan:   1. Essential hypertension  -well controlled    2. Obesity, morbid ([address]  -continue to work on diet and exercise    3. Migraine with vertigo  -check labs, work restrictions of no bending until neuro follow up.    - METABOLIC PANEL, COMPREHENSIVE  - CBC WITH AUTOMATED DIFF  - HEMOGLOBIN A1C WITH EAG  - TSH 3RD GENERATION  - VITAMIN D, 25 HYDROXY  - IRON PROFILE  - VITAMIN B12  - LIPID PANEL     Follow-up Disposition: As needed  I have discussed the diagnosis with the patient and the intended plan as seen in the above orders. The patient has received an after-visit summary and questions were answered concerning future plans. Medication Side Effects and Warnings were discussed with patient: yes  Patient Labs were reviewed: yes  Patient Past Records were reviewed:  yes    Héctor Paris M.D. Discussed the patient's BMI with her. The BMI follow up plan is as follows:     dietary management education, guidance, and counseling  encourage exercise  monitor weight  prescribed dietary intake    An After Visit Summary was printed and given to the patient.

## 2018-05-21 NOTE — MR AVS SNAPSHOT
[address] [address] 
[phone] Patient: Christine Lima MRN: [id number] HYB:[birth year] Visit Information Date & Time Provider Department Dept. Phone Encounter #  
 5/21/2018  2:40 PM Misty Foote MD [facility] [phone] [id number] Upcoming Health Maintenance Date Due Pneumococcal 19-64 Medium Risk (1 of 1 - PPSV23) 3/2/1989 DTaP/Tdap/Td series (1 - Tdap) 3/2/1991 PAP AKA CERVICAL CYTOLOGY 3/2/1991 Influenza Age 5 to Adult 8/1/2018 Allergies as of 5/21/2018  Review Complete On: 5/21/2018 By: Misty Foote MD  
  
 Severity Noted Reaction Type Reactions Chocolate Flavor [Flavoring Agent] High 09/12/2014    Anaphylaxis Flagyl [Metronidazole] High 05/02/2012    Hives, Itching, Swelling Generalized itching, hives, eyes swelled Hazelnut [Tree Nut] High 09/12/2014    Anaphylaxis [facility] 09/12/2014    Anaphylaxis Other Medication High 05/02/2012    Anaphylaxis NUTS Peanut High 09/12/2014    Anaphylaxis Sunflower Oil High 09/12/2014    Anaphylaxis Amlodipine  09/12/2014    Itching Eye swelling Celebrex [Celecoxib]  05/02/2012    Itching Clindamycin  05/02/2012    Itching Doxycycline  05/02/2012    Itching Hydrocodone  09/12/2014    Hives, Itching Eye swelling Levofloxacin  09/12/2014    Hives, Shortness of Breath, Itching Lortab [Hydrocodone-acetaminophen]  05/02/2012    Itching Mold  09/11/2017    Vertigo Pcn [Penicillins]  05/02/2012    Itching Patient tolerated graded Ancef challenge with out adverse side effects on 10/25/2017 Prednisone  05/02/2012    Itching, Other (comments) Muscle spasms Tolerated medrol does packs Sesame Oil  09/12/2014    Hives, Shortness of Breath, Itching Shellfish Containing Products  05/02/2012    Itching Current Immunizations  Reviewed on 7/16/2015 No immunizations on file. Not reviewed this visit You Were Diagnosed With   
  
 Codes Comments Essential hypertension    -  Primary ICD-10-CM: I10 
ICD-9-CM: 401.9 Obesity, morbid ([address]     ICD-10-CM: E66.01 
ICD-9-CM: 278.01 Migraine with vertigo     ICD-10-CM: G43.109 ICD-9-CM: 346.80, 780.4 Vitals BP Pulse Temp Resp Height(growth percentile) Weight(growth percentile) 119/73 (BP 1 Location: Left arm, BP Patient Position: Sitting) 95 98.3 °F (36.8 °C) (Oral) 20 5' 2\" (1.575 m) 253 lb (114.8 kg) SpO2 BMI OB Status Smoking Status 98% 46.27 kg/m2 Having regular periods Current Every Day Smoker Vitals History BMI and BSA Data Body Mass Index Body Surface Area  
 46.27 kg/m 2 2.24 m 2 Preferred Pharmacy Pharmacy Name Phone [address] [place] [address], [facility] Joan Lamb [phone] Your Updated Medication List  
  
   
This list is accurate as of 5/21/18  3:20 PM.  Always use your most recent med list.  
  
  
  
  
 ADVAIR DISKUS 500-50 mcg/dose diskus inhaler Generic drug:  fluticasone-salmeterol 1 Puff two (2) times a day. Breakfast & bedtime  Indications: MAINTENANCE THERAPY FOR ASTHMA * albuterol 5 mg/mL nebulizer solution Commonly known as:  PROVENTIL  
by Nebulization route every six (6) hours as needed. * PROAIR HFA 90 mcg/actuation inhaler Generic drug:  albuterol  
as needed. albuterol-ipratropium 2.5 mg-0.5 mg/3 ml Nebu Commonly known as:  DUO-NEB  
3 mL by Nebulization route. calcium 500 mg Tab Take  by mouth two (2) times a day. citalopram 20 mg tablet Commonly known as:  CELEXA  
TAKE 1 TABLET BY MOUTH DAILY EPINEPHrine 0.3 mg/0.3 mL injection Commonly known as:  EPIPEN  
0.3 mL by IntraMUSCular route once as needed. esomeprazole 40 mg capsule Commonly known as:  NEXIUM  
TAKE 1 CAPSULE BY MOUTH DAILY ferrous sulfate 325 mg (65 mg iron) tablet Take  by mouth two (2) times a day. guaiFENesin-codeine 100-10 mg/5 mL solution Commonly known as:  ROBITUSSIN AC Take 5 mL by mouth three (3) times daily as needed for Cough. hydroCHLOROthiazide 25 mg tablet Commonly known as:  HYDRODIURIL  
TAKE 1 TABLET BY MOUTH DAILY  
  
 ibuprofen 800 mg tablet Commonly known as:  MOTRIN Take 1 Tab by mouth every eight (8) hours as needed for Pain. meclizine 12.5 mg tablet Commonly known as:  ANTIVERT Take 1 Tab by mouth three (3) times daily as needed for Dizziness or Nausea. multivitamin tablet Commonly known as:  ONE A DAY Take 1 Tab by mouth daily. potassium chloride SR 10 mEq tablet Commonly known as:  KLOR-CON 10  
TAKE 2 TABLETS BY MOUTH DAILY pseudoephedrine 30 mg tablet Commonly known as:  SUDAFED Take 60 mg by mouth every six (6) hours as needed for Congestion. rizatriptan 10 mg disintegrating tablet Commonly known as:  MAXALT-MLT Take 10 mg by mouth once as needed for Migraine. SINGULAIR 10 mg tablet Generic drug:  montelukast  
Take 10 mg by mouth nightly. SPIRIVA RESPIMAT 1.25 mcg/actuation inhaler Generic drug:  tiotropium bromide INL 2 PUFFS PO ONCE Day * TOPAMAX 100 mg tablet Generic drug:  topiramate Take 100 mg by mouth daily (with breakfast). * TOPAMAX 100 mg tablet Generic drug:  topiramate Take 200 mg by mouth nightly. triamcinolone acetonide 0.5 % ointment Commonly known as:  KENALOG Apply  to affected area as needed for Skin Irritation. use thin layer  
  
 verapamil  mg CR capsule Commonly known as:  Thora Sport Take 180 mg by mouth nightly. VITAMIN D3 PO Take 1 Cap by mouth daily. XOLAIR 150 mg Solr Generic drug:  omalizumab  
every thirty (30) days. Pt does not know dose   Takes an injection every 30 days ZOFRAN 4 mg tablet Generic drug:  ondansetron hcl Take 4 mg by mouth every eight (8) hours as needed for Nausea. zolpidem 10 mg tablet Commonly known as:  AMBIEN Take 1 Tab by mouth nightly as needed for Sleep. Max Daily Amount: 10 mg.  
  
 * Notice: This list has 4 medication(s) that are the same as other medications prescribed for you. Read the directions carefully, and ask your doctor or other care provider to review them with you. We Performed the Following CBC WITH AUTOMATED DIFF [58510 CPT(R)] HEMOGLOBIN A1C WITH EAG [25882 CPT(R)] IRON PROFILE [id number] CPT(R)] LIPID PANEL [97271 CPT(R)] METABOLIC PANEL, COMPREHENSIVE [81421 CPT(R)] TSH 3RD GENERATION [79985 CPT(R)] VITAMIN B12 [id number] CPT(R)] VITAMIN D, 25 HYDROXY [id number] CPT(R)] Patient Instructions Body Mass Index: Care Instructions Your Care Instructions Body mass index (BMI) can help you see if your weight is raising your risk for health problems. It uses a formula to compare how much you weigh with how tall you are. · A BMI lower than 18.5 is considered underweight. · A BMI between 18.5 and 24.9 is considered healthy. · A BMI between 25 and 29.9 is considered overweight. A BMI of 30 or higher is considered obese. If your BMI is in the normal range, it means that you have a lower risk for weight-related health problems. If your BMI is in the overweight or obese range, you may be at increased risk for weight-related health problems, such as high blood pressure, heart disease, stroke, arthritis or joint pain, and diabetes. If your BMI is in the underweight range, you may be at increased risk for health problems such as fatigue, lower protection (immunity) against illness, muscle loss, bone loss, hair loss, and hormone problems. BMI is just one measure of your risk for weight-related health problems.  You may be at higher risk for health problems if you are not active, you eat an unhealthy diet, or you drink too much alcohol or use tobacco products. Follow-up care is a key part of your treatment and safety. Be sure to make and go to all appointments, and call your doctor if you are having problems. It's also a good idea to know your test results and keep a list of the medicines you take. How can you care for yourself at home? · Practice healthy eating habits. This includes eating plenty of fruits, vegetables, whole grains, lean protein, and low-fat dairy. · If your doctor recommends it, get more exercise. Walking is a good choice. Bit by bit, increase the amount you walk every day. Try for at least 30 minutes on most days of the week. · Do not smoke. Smoking can increase your risk for health problems. If you need help quitting, talk to your doctor about stop-smoking programs and medicines. These can increase your chances of quitting for good. · Limit alcohol to 2 drinks a day for men and 1 drink a day for women. Too much alcohol can cause health problems. If you have a BMI higher than 25 · Your doctor may do other tests to check your risk for weight-related health problems. This may include measuring the distance around your waist. A waist measurement of more than 40 inches in men or 35 inches in women can increase the risk of weight-related health problems. · Talk with your doctor about steps you can take to stay healthy or improve your health. You may need to make lifestyle changes to lose weight and stay healthy, such as changing your diet and getting regular exercise. If you have a BMI lower than 18.5 · Your doctor may do other tests to check your risk for health problems. · Talk with your doctor about steps you can take to stay healthy or improve your health. You may need to make lifestyle changes to gain or maintain weight and stay healthy, such as getting more healthy foods in your diet and doing exercises to build muscle. Where can you learn more? Go to http://horacio-ary.info/. Enter S176 in the search box to learn more about \"Body Mass Index: Care Instructions. \" Current as of: October 13, 2016 Content Version: 11.4 © 4665-1440 Healthwise, Incorporated. Care instructions adapted under license by [employer] (which disclaims liability or warranty for this information). If you have questions about a medical condition or this instruction, always ask your healthcare professional. [address] any warranty or liability for your use of this information. Introducing [facility]! Romanana Mariella introduces SourceLair patient portal. Now you can access parts of your medical record, email your doctor's office, and request medication refills online. 1. In your internet browser, go to [email or web address] 2. Click on the First Time User? Click Here link in the Sign In box. You will see the New Member Sign Up page. 3. Enter your SourceLair Access Code exactly as it appears below. You will not need to use this code after youve completed the sign-up process. If you do not sign up before the expiration date, you must request a new code. · SourceLair Access Code: [email or web address] Expires: 8/19/2018  3:20 PM 
 
4. Enter the last four digits of your Social Security Number (xxxx) and Date of Birth (mm/dd/yyyy) as indicated and click Submit. You will be taken to the next sign-up page. 5. Create a SourceLair ID. This will be your SourceLair login ID and cannot be changed, so think of one that is secure and easy to remember. 6. Create a SourceLair password. You can change your password at any time. 7. Enter your Password Reset Question and Answer. This can be used at a later time if you forget your password. 8. Enter your e-mail address. You will receive e-mail notification when new information is available in [address] 9. Click Sign Up.  You can now view and download portions of your medical record. 10. Click the Download Summary menu link to download a portable copy of your medical information. If you have questions, please visit the Frequently Asked Questions section of the Tappr website. Remember, Tappr is NOT to be used for urgent needs. For medical emergencies, dial 911. Now available from your iPhone and Android! Please provide this summary of care documentation to your next provider. Your primary care clinician is listed as Pete Fitzgerald. If you have any questions after today's visit, please call [phone].

## 2018-05-21 NOTE — LETTER
5/23/2018 6:17 PM 
 
[facility] [address] [address] Dear Nolberto Milan: Please find your most recent results below. Resulted Orders METABOLIC PANEL, COMPREHENSIVE Result Value Ref Range Glucose 79 65 - 99 mg/dL BUN 12 6 - 24 mg/dL Creatinine 0.68 0.57 - 1.00 mg/dL GFR est non- >59 mL/min/1.73 GFR est  >59 mL/min/1.73  
 BUN/Creatinine ratio 18 9 - 23 Sodium 143 134 - 144 mmol/L Potassium 3.5 3.5 - 5.2 mmol/L Chloride 103 96 - 106 mmol/L  
 CO2 24 18 - 29 mmol/L Calcium 9.1 8.7 - 10.2 mg/dL Protein, total 6.8 6.0 - 8.5 g/dL Albumin 3.9 3.5 - 5.5 g/dL GLOBULIN, TOTAL 2.9 1.5 - 4.5 g/dL A-G Ratio 1.3 1.2 - 2.2 Bilirubin, total 0.3 0.0 - 1.2 mg/dL Alk. phosphatase 68 39 - 117 IU/L  
 AST (SGOT) 11 0 - 40 IU/L  
 ALT (SGPT) 9 0 - 32 IU/L Narrative Performed at:  [address]  [id number] : Jairo Barrett MD, Phone:  [phone] CBC WITH AUTOMATED DIFF Result Value Ref Range WBC 9.5 3.4 - 10.8 x10E3/uL  
 RBC 5.06 3.77 - 5.28 x10E6/uL HGB 14.0 11.1 - 15.9 g/dL HCT 41.0 34.0 - 46.6 % MCV 81 79 - 97 fL  
 MCH 27.7 26.6 - 33.0 pg  
 MCHC 34.1 31.5 - 35.7 g/dL  
 RDW 15.8 (H) 12.3 - 15.4 % PLATELET 100 590 - 611 x10E3/uL NEUTROPHILS 69 Not Estab. % Lymphocytes 23 Not Estab. % MONOCYTES 6 Not Estab. % EOSINOPHILS 2 Not Estab. % BASOPHILS 0 Not Estab. %  
 ABS. NEUTROPHILS 6.6 1.4 - 7.0 x10E3/uL Abs Lymphocytes 2.2 0.7 - 3.1 x10E3/uL  
 ABS. MONOCYTES 0.5 0.1 - 0.9 x10E3/uL  
 ABS. EOSINOPHILS 0.1 0.0 - 0.4 x10E3/uL  
 ABS. BASOPHILS 0.0 0.0 - 0.2 x10E3/uL IMMATURE GRANULOCYTES 0 Not Estab. %  
 ABS. IMM. GRANS. 0.0 0.0 - 0.1 x10E3/uL Narrative Performed at:  [address]  [id number] : Jairo Barrett MD, Phone:  [phone] HEMOGLOBIN A1C WITH EAG  
 Result Value Ref Range Hemoglobin A1c 5.6 4.8 - 5.6 % Comment:  
            Pre-diabetes: 5.7 - 6.4 Diabetes: >6.4 Glycemic control for adults with diabetes: <7.0 Estimated average glucose 114 mg/dL Narrative Performed at:  [address]  [id number] : Tiffanie Maier MD, Phone:  [phone] TSH 3RD GENERATION Result Value Ref Range TSH 1.600 0.450 - 4.500 uIU/mL Narrative Performed at:  [address]  [id number] : Tiffanie Maier MD, Phone:  [phone] VITAMIN D, 25 HYDROXY Result Value Ref Range VITAMIN D, 25-HYDROXY 36.8 30.0 - 100.0 ng/mL Comment:  
   Vitamin D deficiency has been defined by the [address] [place] [address] practice guideline as a 
level of serum 25-OH vitamin D less than 20 ng/mL (1,2). The Endocrine Society went on to further define vitamin D 
insufficiency as a level between 21 and 29 ng/mL (2). 1. IOM ([place] of Medicine). 2010. Dietary reference 
   intakes for calcium and D. [facility]: The 
   CMP.LY. 2. Refugio MF, Aime NC, Nadia HOYT, et al. 
   Evaluation, treatment, and prevention of vitamin D 
   deficiency: an Endocrine Society clinical practice 
   guideline. JCEM. 2011 Jul; 96(7):1911-30. Narrative Performed at:  [address]  [id number] : Tiffanie Maier MD, Phone:  [phone] IRON PROFILE Result Value Ref Range TIBC 268 250 - 450 ug/dL UIBC 213 131 - 425 ug/dL Iron 55 27 - 159 ug/dL Iron % saturation 21 15 - 55 % Narrative Performed at:  [address]  [id number] : Tiffanie Maier MD, Phone:  [phone] VITAMIN B12 Result Value Ref Range Vitamin B12 354 232 - 1245 pg/mL Narrative Performed at:  [address]  [id number] : Khang Mcgrath MD, Phone:  [phone] LIPID PANEL Result Value Ref Range Cholesterol, total 220 (H) 100 - 199 mg/dL Triglyceride 200 (H) 0 - 149 mg/dL HDL Cholesterol 42 >39 mg/dL VLDL, calculated 40 5 - 40 mg/dL LDL, calculated 138 (H) 0 - 99 mg/dL Narrative Performed at:  [address]  [id number] : Khang Mcgrath MD, Phone:  [phone] CVD REPORT Result Value Ref Range INTERPRETATION Note Comment:  
   Supplemental report is available. Narrative Performed at:  [address] [place] [address]  [id number] : Cookie De La Cruz MD, Phone:  [phone] RECOMMENDATIONS: 
Cholesterol is elevated, please closely monitor diet and increase exercise, recheck in 6 months. All other labs are within normal limits. Please call me if you have any questions: [phone] Sincerely, Ozzie Yoon MD

## 2018-05-21 NOTE — PATIENT INSTRUCTIONS
Body Mass Index: Care Instructions  Your Care Instructions    Body mass index (BMI) can help you see if your weight is raising your risk for health problems. It uses a formula to compare how much you weigh with how tall you are. · A BMI lower than 18.5 is considered underweight. · A BMI between 18.5 and 24.9 is considered healthy. · A BMI between 25 and 29.9 is considered overweight. A BMI of 30 or higher is considered obese. If your BMI is in the normal range, it means that you have a lower risk for weight-related health problems. If your BMI is in the overweight or obese range, you may be at increased risk for weight-related health problems, such as high blood pressure, heart disease, stroke, arthritis or joint pain, and diabetes. If your BMI is in the underweight range, you may be at increased risk for health problems such as fatigue, lower protection (immunity) against illness, muscle loss, bone loss, hair loss, and hormone problems. BMI is just one measure of your risk for weight-related health problems. You may be at higher risk for health problems if you are not active, you eat an unhealthy diet, or you drink too much alcohol or use tobacco products. Follow-up care is a key part of your treatment and safety. Be sure to make and go to all appointments, and call your doctor if you are having problems. It's also a good idea to know your test results and keep a list of the medicines you take. How can you care for yourself at home? · Practice healthy eating habits. This includes eating plenty of fruits, vegetables, whole grains, lean protein, and low-fat dairy. · If your doctor recommends it, get more exercise. Walking is a good choice. Bit by bit, increase the amount you walk every day. Try for at least 30 minutes on most days of the week. · Do not smoke. Smoking can increase your risk for health problems. If you need help quitting, talk to your doctor about stop-smoking programs and medicines. These can increase your chances of quitting for good. · Limit alcohol to 2 drinks a day for men and 1 drink a day for women. Too much alcohol can cause health problems. If you have a BMI higher than 25  · Your doctor may do other tests to check your risk for weight-related health problems. This may include measuring the distance around your waist. A waist measurement of more than 40 inches in men or 35 inches in women can increase the risk of weight-related health problems. · Talk with your doctor about steps you can take to stay healthy or improve your health. You may need to make lifestyle changes to lose weight and stay healthy, such as changing your diet and getting regular exercise. If you have a BMI lower than 18.5  · Your doctor may do other tests to check your risk for health problems. · Talk with your doctor about steps you can take to stay healthy or improve your health. You may need to make lifestyle changes to gain or maintain weight and stay healthy, such as getting more healthy foods in your diet and doing exercises to build muscle. Where can you learn more? Go to http://horacio-ary.info/. Enter S176 in the search box to learn more about \"Body Mass Index: Care Instructions. \"  Current as of: October 13, 2016  Content Version: 11.4  © 5210-5208 Healthwise, Incorporated. Care instructions adapted under license by [employer] (which disclaims liability or warranty for this information). If you have questions about a medical condition or this instruction, always ask your healthcare professional. [address] any warranty or liability for your use of this information.

## 2018-05-22 LAB
25(OH)D3+25(OH)D2 SERPL-MCNC: 36.8 NG/ML (ref 30–100)
ALBUMIN SERPL-MCNC: 3.9 G/DL (ref 3.5–5.5)
ALBUMIN/GLOB SERPL: 1.3 {RATIO} (ref 1.2–2.2)
ALP SERPL-CCNC: 68 IU/L (ref 39–117)
ALT SERPL-CCNC: 9 IU/L (ref 0–32)
AST SERPL-CCNC: 11 IU/L (ref 0–40)
BASOPHILS # BLD AUTO: 0 X10E3/UL (ref 0–0.2)
BASOPHILS NFR BLD AUTO: 0 %
BILIRUB SERPL-MCNC: 0.3 MG/DL (ref 0–1.2)
BUN SERPL-MCNC: 12 MG/DL (ref 6–24)
BUN/CREAT SERPL: 18 (ref 9–23)
CALCIUM SERPL-MCNC: 9.1 MG/DL (ref 8.7–10.2)
CHLORIDE SERPL-SCNC: 103 MMOL/L (ref 96–106)
CHOLEST SERPL-MCNC: 220 MG/DL (ref 100–199)
CO2 SERPL-SCNC: 24 MMOL/L (ref 18–29)
CREAT SERPL-MCNC: 0.68 MG/DL (ref 0.57–1)
EOSINOPHIL # BLD AUTO: 0.1 X10E3/UL (ref 0–0.4)
EOSINOPHIL NFR BLD AUTO: 2 %
ERYTHROCYTE [DISTWIDTH] IN BLOOD BY AUTOMATED COUNT: 15.8 % (ref 12.3–15.4)
EST. AVERAGE GLUCOSE BLD GHB EST-MCNC: 114 MG/DL
GFR SERPLBLD CREATININE-BSD FMLA CKD-EPI: 104 ML/MIN/1.73
GFR SERPLBLD CREATININE-BSD FMLA CKD-EPI: 120 ML/MIN/1.73
GLOBULIN SER CALC-MCNC: 2.9 G/DL (ref 1.5–4.5)
GLUCOSE SERPL-MCNC: 79 MG/DL (ref 65–99)
HBA1C MFR BLD: 5.6 % (ref 4.8–5.6)
HCT VFR BLD AUTO: 41 % (ref 34–46.6)
HDLC SERPL-MCNC: 42 MG/DL
HGB BLD-MCNC: 14 G/DL (ref 11.1–15.9)
IMM GRANULOCYTES # BLD: 0 X10E3/UL (ref 0–0.1)
IMM GRANULOCYTES NFR BLD: 0 %
INTERPRETATION, 910389: NORMAL
IRON SATN MFR SERPL: 21 % (ref 15–55)
IRON SERPL-MCNC: 55 UG/DL (ref 27–159)
LDLC SERPL CALC-MCNC: 138 MG/DL (ref 0–99)
LYMPHOCYTES # BLD AUTO: 2.2 X10E3/UL (ref 0.7–3.1)
LYMPHOCYTES NFR BLD AUTO: 23 %
MCH RBC QN AUTO: 27.7 PG (ref 26.6–33)
MCHC RBC AUTO-ENTMCNC: 34.1 G/DL (ref 31.5–35.7)
MCV RBC AUTO: 81 FL (ref 79–97)
MONOCYTES # BLD AUTO: 0.5 X10E3/UL (ref 0.1–0.9)
MONOCYTES NFR BLD AUTO: 6 %
NEUTROPHILS # BLD AUTO: 6.6 X10E3/UL (ref 1.4–7)
NEUTROPHILS NFR BLD AUTO: 69 %
PLATELET # BLD AUTO: 379 X10E3/UL (ref 150–379)
POTASSIUM SERPL-SCNC: 3.5 MMOL/L (ref 3.5–5.2)
PROT SERPL-MCNC: 6.8 G/DL (ref 6–8.5)
RBC # BLD AUTO: 5.06 X10E6/UL (ref 3.77–5.28)
SODIUM SERPL-SCNC: 143 MMOL/L (ref 134–144)
TIBC SERPL-MCNC: 268 UG/DL (ref 250–450)
TRIGL SERPL-MCNC: 200 MG/DL (ref 0–149)
TSH SERPL DL<=0.005 MIU/L-ACNC: 1.6 UIU/ML (ref 0.45–4.5)
UIBC SERPL-MCNC: 213 UG/DL (ref 131–425)
VIT B12 SERPL-MCNC: 354 PG/ML (ref 232–1245)
VLDLC SERPL CALC-MCNC: 40 MG/DL (ref 5–40)
WBC # BLD AUTO: 9.5 X10E3/UL (ref 3.4–10.8)

## 2018-05-31 ENCOUNTER — TELEPHONE (OUTPATIENT)
Dept: FAMILY MEDICINE CLINIC | Age: 48
End: 2018-05-31

## 2018-05-31 RX ORDER — POTASSIUM CHLORIDE 750 MG/1
TABLET, FILM COATED, EXTENDED RELEASE ORAL
Qty: 180 TAB | Refills: 3 | Status: SHIPPED | OUTPATIENT
Start: 2018-05-31

## 2018-06-04 RX ORDER — HYDROCHLOROTHIAZIDE 25 MG/1
TABLET ORAL
Qty: 90 TAB | Refills: 3 | Status: SHIPPED | OUTPATIENT
Start: 2018-06-04 | End: 2019-04-29 | Stop reason: SDUPTHER

## 2018-06-04 RX ORDER — CITALOPRAM 20 MG/1
TABLET, FILM COATED ORAL
Qty: 90 TAB | Refills: 3 | Status: SHIPPED | OUTPATIENT
Start: 2018-06-04 | End: 2019-04-29 | Stop reason: SDUPTHER

## 2018-06-05 ENCOUNTER — DOCUMENTATION ONLY (OUTPATIENT)
Dept: FAMILY MEDICINE CLINIC | Age: 48
End: 2018-06-05

## 2018-06-05 NOTE — PROGRESS NOTES
Patient request for medical records was faxed to [facility] [phone] to be processed.

## 2018-07-25 RX ORDER — EPINEPHRINE 0.3 MG/.3ML
INJECTION SUBCUTANEOUS
Qty: 2 SYRINGE | Refills: 0 | Status: SHIPPED | OUTPATIENT
Start: 2018-07-25

## 2018-08-06 ENCOUNTER — OFFICE VISIT (OUTPATIENT)
Dept: FAMILY MEDICINE CLINIC | Age: 48
End: 2018-08-06

## 2018-08-06 VITALS
WEIGHT: 262 LBS | SYSTOLIC BLOOD PRESSURE: 142 MMHG | DIASTOLIC BLOOD PRESSURE: 82 MMHG | OXYGEN SATURATION: 100 % | HEART RATE: 68 BPM | BODY MASS INDEX: 48.21 KG/M2 | HEIGHT: 62 IN | TEMPERATURE: 98.6 F | RESPIRATION RATE: 16 BRPM

## 2018-08-06 DIAGNOSIS — I10 ESSENTIAL HYPERTENSION: ICD-10-CM

## 2018-08-06 DIAGNOSIS — T78.40XA ALLERGIC REACTION TO DRUG, INITIAL ENCOUNTER: Primary | ICD-10-CM

## 2018-08-06 DIAGNOSIS — R42 DIZZINESS: ICD-10-CM

## 2018-08-06 DIAGNOSIS — R51.9 ACUTE NONINTRACTABLE HEADACHE, UNSPECIFIED HEADACHE TYPE: ICD-10-CM

## 2018-08-06 RX ORDER — METHYLPREDNISOLONE 4 MG/1
TABLET ORAL
Qty: 2 DOSE PACK | Refills: 0 | Status: SHIPPED | OUTPATIENT
Start: 2018-08-06

## 2018-08-06 NOTE — LETTER
8/6/2018 3:02 PM 
 
Ms. Edelmira Richmond 
[facility] [address] [facility] [address] Please excuse Ms. Jeana Belcher from work 8/3/18 and 8/6/18 due to illness. Sincerely, Dwaine Vega NP

## 2018-08-06 NOTE — PROGRESS NOTES
1. Have you been to the ER, urgent care clinic since your last visit? Hospitalized since your last visit? Yes, [facility] ER, 8/3/18    2. Have you seen or consulted any other health care providers outside of the [address] [place] [address] since your last visit? Include any pap smears or colon screening.  No     Chief Complaint   Patient presents with    Follow-up     ER Visit 8/3/18

## 2018-08-06 NOTE — PROGRESS NOTES
Chief Complaint   Patient presents with    Follow-up     ER Visit 8/3/18     she is a 50y.o. year old female who presents for evalution. Pt was at Neuro on Friday for her migraines and BPPV. States received multiple botox injections to help treat these. Pt states immediately after injections started having dry mouth, crying uncontrollably, felt unable to move hands or lift arms. Neuro massaged arms and eventually feeling came back, when stood up felt unsteady and dizzy. Ended up laying down for about 20 minutes in office, started having R head pain. Continued to feel dizzy, was confused, and laughing uncontrollably. Called EMS and taken to ER. Was given fluids and Benadryl and migraine cocktail to help with headache. Pt felt slightly better after ER visit, discharged and went home. Still having headache, now entire head is effected. Pt states to feel drunk or like something is off. Still having dizziness and balance issues, lack of appetite. Reviewed PmHx, RxHx, FmHx, SocHx, AllgHx and updated and dated in the chart. Review of Systems - negative except as listed above in the HPI    Objective:     Vitals:    08/06/18 1443   BP: 142/82   Pulse: 68   Resp: 16   Temp: 98.6 °F (37 °C)   TempSrc: Oral   SpO2: 100%   Weight: 262 lb (118.8 kg)   Height: 5' 2\" (1.575 m)     Physical Examination: General appearance - alert, well appearing, and in no distress  Chest - clear to auscultation, no wheezes, rales or rhonchi, symmetric air entry  Heart - normal rate, regular rhythm, normal S1, S2, no murmurs, rubs, clicks or gallops  Neurological - alert, oriented, normal speech, no focal findings or movement disorder noted    Assessment/ Plan:   Diagnoses and all orders for this visit:    1. Allergic reaction to drug, initial encounter  -     methylPREDNISolone, PF, (SOLU-MEDROL) 125 mg/2 mL solr; 2 mL by IntraVENous route once for 1 dose.   -     METHYLPREDNISOLONE INJECTION (Qty 2)  - THER/PROPH/DIAG INJECTION, SUBCUT/IM  -     methylPREDNISolone (MEDROL DOSEPACK) 4 mg tablet; 12 day course  New rxs. Use Benadryl and push fluids. F/U prn    2. Dizziness  Secondary to reaction, should resolve with steroids as well. 3. Acute nonintractable headache, unspecified headache type  -     methylPREDNISolone (MEDROL DOSEPACK) 4 mg tablet; 12 day course  New rx. 4. Essential hypertension  Elevated today. Typically well controlled, likely secondary to allergic reaction and nerves. Pt voiced understanding regarding plan of care. Follow-up Disposition:  Return if symptoms worsen or fail to improve. I have discussed the diagnosis with the patient and the intended plan as seen in the above orders. The patient has received an after-visit summary and questions were answered concerning future plans.      Medication Side Effects and Warnings were discussed with patient    Rodolfo Petersen NP

## 2018-08-06 NOTE — MR AVS SNAPSHOT
[address] [address] 
[phone] Patient: Mac Ashby MRN: [id number] Visit Information Date & Time Provider Department Dept. Phone Encounter #  
 8/6/2018  2:30 PM Maddie Ragsdale NP [address] [facility] [phone] [id number] Follow-up Instructions Return if symptoms worsen or fail to improve. Upcoming Health Maintenance Date Due Pneumococcal 19-64 Medium Risk (1 of 1 - PPSV23) 3/2/1989 DTaP/Tdap/Td series (1 - Tdap) 3/2/1991 PAP AKA CERVICAL CYTOLOGY 3/2/1991 Influenza Age 5 to Adult 8/1/2018 Allergies as of 8/6/2018  Review Complete On: 8/6/2018 By: Margreta Boas, LPN Severity Noted Reaction Type Reactions Chocolate Flavor [Flavoring Agent] High 09/12/2014    Anaphylaxis Flagyl [Metronidazole] High 05/02/2012    Hives, Itching, Swelling Generalized itching, hives, eyes swelled Hazelnut [Tree Nut] High 09/12/2014    Anaphylaxis [facility] 09/12/2014    Anaphylaxis Other Medication High 05/02/2012    Anaphylaxis NUTS Peanut High 09/12/2014    Anaphylaxis Sunflower Oil High 09/12/2014    Anaphylaxis Amlodipine  09/12/2014    Itching Eye swelling Botox [Onabotulinumtoxina]  08/06/2018    Other (comments) Celebrex [Celecoxib]  05/02/2012    Itching Clindamycin  05/02/2012    Itching Doxycycline  05/02/2012    Itching Hydrocodone  09/12/2014    Hives, Itching Eye swelling Levofloxacin  09/12/2014    Hives, Shortness of Breath, Itching Lortab [Hydrocodone-acetaminophen]  05/02/2012    Itching Mold  09/11/2017    Vertigo Pcn [Penicillins]  05/02/2012    Itching Patient tolerated graded Ancef challenge with out adverse side effects on 10/25/2017 Prednisone  05/02/2012    Itching, Other (comments) Muscle spasms Tolerated medrol does packs Sesame Oil  09/12/2014    Hives, Shortness of Breath, Itching Shellfish Containing Products  05/02/2012    Itching Current Immunizations  Reviewed on 7/16/2015 No immunizations on file. Not reviewed this visit You Were Diagnosed With   
  
 Codes Comments Allergic reaction to drug, initial encounter    -  Primary ICD-10-CM: T78.40XA ICD-9-CM: 995.27 Dizziness     ICD-10-CM: G85 ICD-9-CM: 780.4 Acute nonintractable headache, unspecified headache type     ICD-10-CM: R51 ICD-9-CM: 784.0 Essential hypertension     ICD-10-CM: I10 
ICD-9-CM: 401.9 Vitals BP Pulse Temp Resp Height(growth percentile) Weight(growth percentile) 142/82 68 98.6 °F (37 °C) (Oral) 16 5' 2\" (1.575 m) 262 lb (118.8 kg) SpO2 BMI OB Status Smoking Status 100% 47.92 kg/m2 Having regular periods Current Every Day Smoker Vitals History BMI and BSA Data Body Mass Index Body Surface Area  
 47.92 kg/m 2 2.28 m 2 Preferred Pharmacy Pharmacy Name Phone [facility] [address] [place] [address] [phone] Your Updated Medication List  
  
   
This list is accurate as of 8/6/18  3:01 PM.  Always use your most recent med list.  
  
  
  
  
 ADVAIR DISKUS 500-50 mcg/dose diskus inhaler Generic drug:  fluticasone-salmeterol 1 Puff two (2) times a day. Breakfast & bedtime  Indications: MAINTENANCE THERAPY FOR ASTHMA * albuterol 5 mg/mL nebulizer solution Commonly known as:  PROVENTIL  
by Nebulization route every six (6) hours as needed. * PROAIR HFA 90 mcg/actuation inhaler Generic drug:  albuterol  
as needed. albuterol-ipratropium 2.5 mg-0.5 mg/3 ml Nebu Commonly known as:  DUO-NEB  
3 mL by Nebulization route. calcium 500 mg Tab Take  by mouth two (2) times a day. citalopram 20 mg tablet Commonly known as:  CELEXA  
TAKE 1 TABLET BY MOUTH DAILY EPINEPHrine 0.3 mg/0.3 mL injection Commonly known as:  EPIPEN  
ADMINISTER 0.3 ML IN THE MUSCLE 1 TIME AS NEEDED  
  
 esomeprazole 40 mg capsule Commonly known as:  NEXIUM  
TAKE 1 CAPSULE BY MOUTH DAILY ferrous sulfate 325 mg (65 mg iron) tablet Take  by mouth two (2) times a day. guaiFENesin-codeine 100-10 mg/5 mL solution Commonly known as:  ROBITUSSIN AC Take 5 mL by mouth three (3) times daily as needed for Cough. hydroCHLOROthiazide 25 mg tablet Commonly known as:  HYDRODIURIL  
TAKE 1 TABLET BY MOUTH DAILY  
  
 ibuprofen 800 mg tablet Commonly known as:  MOTRIN Take 1 Tab by mouth every eight (8) hours as needed for Pain. meclizine 12.5 mg tablet Commonly known as:  ANTIVERT Take 1 Tab by mouth three (3) times daily as needed for Dizziness or Nausea. methylPREDNISolone (PF) 125 mg/2 mL Solr Commonly known as:  SOLU-MEDROL 2 mL by IntraVENous route once for 1 dose. methylPREDNISolone 4 mg tablet Commonly known as:  MEDROL DOSEPACK  
12 day course  
  
 multivitamin tablet Commonly known as:  ONE A DAY Take 1 Tab by mouth daily. potassium chloride SR 10 mEq tablet Commonly known as:  KLOR-CON 10  
TAKE 2 TABLETS BY MOUTH DAILY pseudoephedrine 30 mg tablet Commonly known as:  SUDAFED Take 60 mg by mouth every six (6) hours as needed for Congestion. rizatriptan 10 mg disintegrating tablet Commonly known as:  MAXALT-MLT Take 10 mg by mouth once as needed for Migraine. SINGULAIR 10 mg tablet Generic drug:  montelukast  
Take 10 mg by mouth nightly. SPIRIVA RESPIMAT 1.25 mcg/actuation inhaler Generic drug:  tiotropium bromide INL 2 PUFFS PO ONCE Day * TOPAMAX 100 mg tablet Generic drug:  topiramate Take 100 mg by mouth daily (with breakfast). * TOPAMAX 100 mg tablet Generic drug:  topiramate Take 200 mg by mouth nightly. triamcinolone acetonide 0.5 % ointment Commonly known as:  KENALOG Apply  to affected area as needed for Skin Irritation. use thin layer  
  
 verapamil  mg CR capsule Commonly known as:  Arelis [place] Take 180 mg by mouth nightly. VITAMIN D3 PO Take 1 Cap by mouth daily. XOLAIR 150 mg Solr Generic drug:  omalizumab  
every thirty (30) days. Pt does not know dose   Takes an injection every 30 days ZOFRAN 4 mg tablet Generic drug:  ondansetron hcl Take 4 mg by mouth every eight (8) hours as needed for Nausea. zolpidem 10 mg tablet Commonly known as:  AMBIEN Take 1 Tab by mouth nightly as needed for Sleep. Max Daily Amount: 10 mg.  
  
 * Notice: This list has 4 medication(s) that are the same as other medications prescribed for you. Read the directions carefully, and ask your doctor or other care provider to review them with you. Prescriptions Sent to Pharmacy Refills  
 methylPREDNISolone (MEDROL DOSEPACK) 4 mg tablet 0 Si day course Class: Normal  
 Pharmacy: [facility] [address] [place] [address] AT [address] [place] [address] #: [phone] We Performed the Following METHYLPREDNISOLONE INJECTION [ [facility]] CO THER/PROPH/DIAG INJECTION, SUBCUT/IM [id number] CPT(R)] Follow-up Instructions Return if symptoms worsen or fail to improve. Introducing [facility]! [facility] introduces [employer] patient portal. Now you can access parts of your medical record, email your doctor's office, and request medication refills online. 1. In your internet browser, go to [email or web address] 2. Click on the First Time User? Click Here link in the Sign In box. You will see the New Member Sign Up page. 3. Enter your [employer] Access Code exactly as it appears below. You will not need to use this code after youve completed the sign-up process.  If you do not sign up before the expiration date, you must request a new code. 
 
· [employer] Access Code: [email or web address] Expires: 8/19/2018  3:20 PM 
 
4. Enter the last four digits of your Social Security Number (xxxx) and Date of Birth (mm/dd/yyyy) as indicated and click Submit. You will be taken to the next sign-up page. 5. Create a [employer] ID. This will be your [employer] login ID and cannot be changed, so think of one that is secure and easy to remember. 6. Create a [employer] password. You can change your password at any time. 7. Enter your Password Reset Question and Answer. This can be used at a later time if you forget your password. 8. Enter your e-mail address. You will receive e-mail notification when new information is available in [address] 9. Click Sign Up. You can now view and download portions of your medical record. 10. Click the Download Summary menu link to download a portable copy of your medical information. If you have questions, please visit the Frequently Asked Questions section of the [employer] website. Remember, [employer] is NOT to be used for urgent needs. For medical emergencies, dial 911. Now available from your iPhone and Android! Please provide this summary of care documentation to your next provider. Your primary care clinician is listed as Pete Fitzgerald. If you have any questions after today's visit, please call [phone].

## 2018-08-07 ENCOUNTER — TELEPHONE (OUTPATIENT)
Dept: FAMILY MEDICINE CLINIC | Age: 48
End: 2018-08-07

## 2018-08-07 NOTE — TELEPHONE ENCOUNTER
Pt calling and states she thinks that the injection that she received yesterday is giving her a yeast infection with symptoms of vaginal irritation. Wants to know what she can take for this. Please call her [phone].

## 2018-08-08 RX ORDER — FLUCONAZOLE 150 MG/1
150 TABLET ORAL DAILY
Qty: 2 TAB | Refills: 0 | Status: SHIPPED | OUTPATIENT
Start: 2018-08-08 | End: 2018-08-09

## 2019-04-02 ENCOUNTER — OFFICE VISIT (OUTPATIENT)
Dept: FAMILY MEDICINE CLINIC | Age: 49
End: 2019-04-02

## 2019-04-02 VITALS
RESPIRATION RATE: 16 BRPM | SYSTOLIC BLOOD PRESSURE: 128 MMHG | WEIGHT: 258 LBS | BODY MASS INDEX: 47.48 KG/M2 | HEIGHT: 62 IN | OXYGEN SATURATION: 96 % | TEMPERATURE: 99 F | HEART RATE: 70 BPM | DIASTOLIC BLOOD PRESSURE: 83 MMHG

## 2019-04-02 DIAGNOSIS — F51.01 PRIMARY INSOMNIA: Primary | ICD-10-CM

## 2019-04-02 DIAGNOSIS — S29.011A INTERCOSTAL MUSCLE STRAIN, INITIAL ENCOUNTER: ICD-10-CM

## 2019-04-02 DIAGNOSIS — J30.9 CHRONIC ALLERGIC RHINITIS: ICD-10-CM

## 2019-04-02 DIAGNOSIS — R51.9 SINUS HEADACHE: ICD-10-CM

## 2019-04-02 RX ORDER — KETOROLAC TROMETHAMINE 30 MG/ML
30 INJECTION, SOLUTION INTRAMUSCULAR; INTRAVENOUS ONCE
Qty: 1 VIAL | Refills: 0
Start: 2019-04-02 | End: 2019-04-02

## 2019-04-02 RX ORDER — CETIRIZINE HCL 10 MG
10 TABLET ORAL DAILY
Qty: 90 TAB | Refills: 3 | Status: SHIPPED | OUTPATIENT
Start: 2019-04-02

## 2019-04-02 RX ORDER — AZELASTINE HYDROCHLORIDE, FLUTICASONE PROPIONATE 137; 50 UG/1; UG/1
1 SPRAY, METERED NASAL 2 TIMES DAILY
Qty: 23 G | Refills: 11 | Status: SHIPPED | OUTPATIENT
Start: 2019-04-02

## 2019-04-02 RX ORDER — ZOLPIDEM TARTRATE 10 MG/1
10 TABLET ORAL
Qty: 30 TAB | Refills: 5 | Status: SHIPPED | OUTPATIENT
Start: 2019-04-02

## 2019-04-02 NOTE — LETTER
NOTIFICATION RETURN TO WORK / SCHOOL 
 
4/2/2019 12:17 PM 
 
Ms. Warden Vitale 
Formerly [facility], [facility] [address] [facility] [phone] To Whom It May Concern: 
 
Warden Vitale is currently under the care of [facility]. She will return to work/school on: 4/3/19 If there are questions or concerns please have the patient contact our office. Sincerely, [address] [facility] [address], DO

## 2019-04-02 NOTE — PROGRESS NOTES
Ally Bourgeois is a 52 y.o. female   Chief Complaint   Patient presents with    Cough    Nasal Congestion    Side Pain    Back Pain    Medication Refill     Ambien    Patient presents in office today with c/o cough and congestion for over a month. Pt was seen by niels and and was told sinus infection given zithromax. This did not help. Pt went to pulmonologist 2.5 weeks ago and was seen by an NP there and had a cxr and was told no PNA. Pt was given a doxy which she is allergic to. This made her feel worse and gave her breathing issues. Was put on a steroid by Dr. Ulises Oneal with no relief this was 2 weeks ago. Felt she was getting better then worse again. No fever no chills. Cough is non productive. Pt is an active smoker. Pt is having a CT of her sinuses ordered by ENT. States has a severe HA as well. Pt is also on singulair and takes this every. Also has c/o pain on her right side that wraps around towards her back. Not treating the pain with anything. Needs a refill of Ambien for insomnia. This works very well for her. she is a 52y.o. year old female who presents for evalution. Reviewed PmHx, RxHx, FmHx, SocHx, AllgHx and updated and dated in the chart. Review of Systems - negative except as listed above in the HPI    Objective:     Vitals:    04/02/19 1142   BP: 128/83   Pulse: 70   Resp: 16   Temp: 99 °F (37.2 °C)   TempSrc: Oral   SpO2: 96%   Weight: 258 lb (117 kg)   Height: 5' 2\" (1.575 m)       Current Outpatient Medications   Medication Sig    zolpidem (AMBIEN) 10 mg tablet Take 1 Tab by mouth nightly as needed for Sleep. Max Daily Amount: 10 mg.    ketorolac (TORADOL) 30 mg/mL (1 mL) injection 1 mL by IntraVENous route once for 1 dose.  cetirizine (ZYRTEC) 10 mg tablet Take 1 Tab by mouth daily.  azelastine-fluticasone (DYMISTA) 137-50 mcg/spray spry 1 Spray by Nasal route two (2) times a day.     EPINEPHrine (EPIPEN) 0.3 mg/0.3 mL injection ADMINISTER 0.3 ML IN THE MUSCLE 1 TIME AS NEEDED    hydroCHLOROthiazide (HYDRODIURIL) 25 mg tablet TAKE 1 TABLET BY MOUTH DAILY    citalopram (CELEXA) 20 mg tablet TAKE 1 TABLET BY MOUTH DAILY    potassium chloride SR (KLOR-CON 10) 10 mEq tablet TAKE 2 TABLETS BY MOUTH DAILY    esomeprazole (NEXIUM) 40 mg capsule TAKE 1 CAPSULE BY MOUTH DAILY    CHOLECALCIFEROL, VITAMIN D3, (VITAMIN D3 PO) Take 1 Cap by mouth daily.  topiramate (TOPAMAX) 100 mg tablet Take 100 mg by mouth daily (with breakfast).  topiramate (TOPAMAX) 100 mg tablet Take 200 mg by mouth nightly.  rizatriptan (MAXALT-MLT) 10 mg disintegrating tablet Take 10 mg by mouth once as needed for Migraine.  pseudoephedrine (SUDAFED) 30 mg tablet Take 60 mg by mouth every six (6) hours as needed for Congestion.  verapamil ER (VERELAN) 180 mg CR capsule Take 180 mg by mouth nightly.  albuterol-ipratropium (DUO-NEB) 2.5 mg-0.5 mg/3 ml nebu 3 mL by Nebulization route.  guaiFENesin-codeine (ROBITUSSIN AC) 100-10 mg/5 mL solution Take 5 mL by mouth three (3) times daily as needed for Cough.  ondansetron hcl (ZOFRAN, AS HYDROCHLORIDE,) 4 mg tablet Take 4 mg by mouth every eight (8) hours as needed for Nausea.  triamcinolone acetonide (KENALOG) 0.5 % ointment Apply  to affected area as needed for Skin Irritation. use thin layer    multivitamin (ONE A DAY) tablet Take 1 Tab by mouth daily.  SPIRIVA RESPIMAT 1.25 mcg/actuation inhaler INL 2 PUFFS PO ONCE Day    ADVAIR DISKUS 500-50 mcg/dose diskus inhaler 1 Puff two (2) times a day. Breakfast & bedtime  Indications: MAINTENANCE THERAPY FOR ASTHMA    meclizine (ANTIVERT) 12.5 mg tablet Take 1 Tab by mouth three (3) times daily as needed for Dizziness or Nausea.  ibuprofen (MOTRIN) 800 mg tablet Take 1 Tab by mouth every eight (8) hours as needed for Pain.  PROAIR HFA 90 mcg/actuation inhaler as needed.  XOLAIR 150 mg solr solution every thirty (30) days.  Pt does not know dose   Takes an injection every 30 days    montelukast (SINGULAIR) 10 mg tablet Take 10 mg by mouth nightly.  calcium 500 mg Tab Take  by mouth two (2) times a day.  ferrous sulfate 325 mg (65 mg iron) tablet Take  by mouth two (2) times a day.  albuterol (PROVENTIL) 5 mg/mL nebulizer solution by Nebulization route every six (6) hours as needed.  methylPREDNISolone (MEDROL DOSEPACK) 4 mg tablet 12 day course     No current facility-administered medications for this visit. Physical Examination: General appearance - alert, well appearing, and in no distress  Eyes - pupils equal and reactive, extraocular eye movements intact  Nose - mucosal congestion  Mouth - mucous membranes moist, pharynx normal without lesions  Neck - supple, no significant adenopathy  Chest - clear to auscultation, no wheezes, rales or rhonchi, symmetric air entry  Heart - normal rate, regular rhythm, normal S1, S2, no murmurs, rubs, clicks or gallops  Musculoskeletal - TTP oer R side ribs with ttp over intercostals, no palpable deformity      Assessment/ Plan:   Diagnoses and all orders for this visit:    1. Primary insomnia  -     zolpidem (AMBIEN) 10 mg tablet; Take 1 Tab by mouth nightly as needed for Sleep. Max Daily Amount: 10 mg.    2. Sinus headache  -     ketorolac (TORADOL) 30 mg/mL (1 mL) injection; 1 mL by IntraVENous route once for 1 dose. -     KETOROLAC TROMETHAMINE INJ  -     IL THER/PROPH/DIAG INJECTION, SUBCUT/IM    3. Chronic allergic rhinitis  -     cetirizine (ZYRTEC) 10 mg tablet; Take 1 Tab by mouth daily. -     azelastine-fluticasone (DYMISTA) 137-50 mcg/spray spry; 1 Spray by Nasal route two (2) times a day. 4. Intercostal muscle strain, initial encounter  Motrin prn, from coughing    Suspect dough is more from allergies from sinus drainage.   D/w pt smoking cessation and to finish sinus work up with ENT and if cough no better to f/u here or pulm and would consider CT chest.       Follow-up and Dispositions · Return if symptoms worsen or fail to improve. I have discussed the diagnosis with the patient and the intended plan as seen in the above orders. The patient has received an after-visit summary and questions were answered concerning future plans. Pt conveyed understanding of plan.     Medication Side Effects and Warnings were discussed with patient      Darrion Leyva DO

## 2019-04-02 NOTE — PROGRESS NOTES
Chief Complaint   Patient presents with    Cough    Nasal Congestion    Side Pain    Back Pain    Medication Refill     Ambien     Patient presents in office today with c/o cough and congestion for over a month. Was put on a steroid by Dr. Angela Rodriges with no relief. Also [facility] with [facility]. Also has c/o pain on her right side that wraps around towards her back. Not treating the pain with anything. Needs a refill of Ambien. No other concerns. 1. Have you been to the ER, urgent care clinic since your last visit? Hospitalized since your last visit? Yes When: 3/2019 [facility] for sinus infection    2. Have you seen or consulted any other health care providers outside of the [address] [place] [address] since your last visit? Include any pap smears or colon screening. 3/2019 Dr. Angela Rodriges pulmonary     .   Learning Assessment 5/21/2018   PRIMARY LEARNER Patient   HIGHEST LEVEL OF EDUCATION - PRIMARY LEARNER  GRADUATED HIGH SCHOOL OR GED   BARRIERS PRIMARY LEARNER NONE   CO-LEARNER CAREGIVER No   PRIMARY LANGUAGE ENGLISH   LEARNER PREFERENCE PRIMARY DEMONSTRATION   LEARNING SPECIAL TOPICS nop   ANSWERED BY pt   RELATIONSHIP SELF

## 2019-04-02 NOTE — PATIENT INSTRUCTIONS
A Healthy Lifestyle: Care Instructions  Your Care Instructions    A healthy lifestyle can help you feel good, stay at a healthy weight, and have plenty of energy for both work and play. A healthy lifestyle is something you can share with your whole family. A healthy lifestyle also can lower your risk for serious health problems, such as high blood pressure, heart disease, and diabetes. You can follow a few steps listed below to improve your health and the health of your family. Follow-up care is a key part of your treatment and safety. Be sure to make and go to all appointments, and call your doctor if you are having problems. It's also a good idea to know your test results and keep a list of the medicines you take. How can you care for yourself at home? · Do not eat too much sugar, fat, or fast foods. You can still have dessert and treats now and then. The goal is moderation. · Start small to improve your eating habits. Pay attention to portion sizes, drink less juice and soda pop, and eat more fruits and vegetables. ? Eat a healthy amount of food. A 3-ounce serving of meat, for example, is about the size of a deck of cards. Fill the rest of your plate with vegetables and whole grains. ? Limit the amount of soda and sports drinks you have every day. Drink more water when you are thirsty. ? Eat at least 5 servings of fruits and vegetables every day. It may seem like a lot, but it is not hard to reach this goal. A serving or helping is 1 piece of fruit, 1 cup of vegetables, or 2 cups of leafy, raw vegetables. Have an apple or some carrot sticks as an afternoon snack instead of a candy bar. Try to have fruits and/or vegetables at every meal.  · Make exercise part of your daily routine. You may want to start with simple activities, such as walking, bicycling, or slow swimming. Try to be active 30 to 60 minutes every day. You do not need to do all 30 to 60 minutes all at once.  For example, you can exercise 3 times a day for 10 or 20 minutes. Moderate exercise is safe for most people, but it is always a good idea to talk to your doctor before starting an exercise program.  · Keep moving. Darius Badillo the lawn, work in the garden, or [employer]. Take the stairs instead of the elevator at work. · If you smoke, quit. People who smoke have an increased risk for heart attack, stroke, cancer, and other lung illnesses. Quitting is hard, but there are ways to boost your chance of quitting tobacco for good. ? Use nicotine gum, patches, or lozenges. ? Ask your doctor about stop-smoking programs and medicines. ? Keep trying. In addition to reducing your risk of diseases in the future, you will notice some benefits soon after you stop using tobacco. If you have shortness of breath or asthma symptoms, they will likely get better within a few weeks after you quit. · Limit how much alcohol you drink. Moderate amounts of alcohol (up to 2 drinks a day for men, 1 drink a day for women) are okay. But drinking too much can lead to liver problems, high blood pressure, and other health problems. Family health  If you have a family, there are many things you can do together to improve your health. · Eat meals together as a family as often as possible. · Eat healthy foods. This includes fruits, vegetables, lean meats and dairy, and whole grains. · Include your family in your fitness plan. Most people think of activities such as jogging or tennis as the way to fitness, but there are many ways you and your family can be more active. Anything that makes you breathe hard and gets your heart pumping is exercise. Here are some tips:  ? Walk to do errands or to take your child to school or the bus.  ? Go for a family bike ride after dinner instead of watching TV. Where can you learn more? Go to http://horacio-ary.info/. Enter G108 in the search box to learn more about \"A Healthy Lifestyle: Care Instructions. \"  Current as of: September 11, 2018  Content Version: 11.9  © 0152-0690 [employer] / [employer]. Care instructions adapted under license by [employer] (which disclaims liability or warranty for this information). If you have questions about a medical condition or this instruction, always ask your healthcare professional. [address] any warranty or liability for your use of this information.

## 2019-04-19 ENCOUNTER — OFFICE VISIT (OUTPATIENT)
Dept: FAMILY MEDICINE CLINIC | Age: 49
End: 2019-04-19

## 2019-04-19 VITALS
RESPIRATION RATE: 16 BRPM | DIASTOLIC BLOOD PRESSURE: 88 MMHG | OXYGEN SATURATION: 96 % | WEIGHT: 277 LBS | HEART RATE: 67 BPM | HEIGHT: 62 IN | SYSTOLIC BLOOD PRESSURE: 123 MMHG | BODY MASS INDEX: 50.97 KG/M2 | TEMPERATURE: 98.5 F

## 2019-04-19 DIAGNOSIS — K21.9 GASTROESOPHAGEAL REFLUX DISEASE, ESOPHAGITIS PRESENCE NOT SPECIFIED: Primary | ICD-10-CM

## 2019-04-19 DIAGNOSIS — M94.0 ACUTE COSTOCHONDRITIS: ICD-10-CM

## 2019-04-19 DIAGNOSIS — R05.9 COUGH: ICD-10-CM

## 2019-04-19 RX ORDER — NABUMETONE 500 MG/1
TABLET, FILM COATED ORAL 2 TIMES DAILY
COMMUNITY

## 2019-04-19 RX ORDER — PANTOPRAZOLE SODIUM 40 MG/1
40 TABLET, DELAYED RELEASE ORAL DAILY
Qty: 30 TAB | Refills: 5 | Status: SHIPPED | OUTPATIENT
Start: 2019-04-19

## 2019-04-19 NOTE — PROGRESS NOTES
Chief Complaint   Patient presents with    Cough    Nasal Congestion     Patient presents in office today for f/u to cough and congestion  Has been taking the Zyrtec and states that it has been helping some. Still has the sinus headache. No other concern. 1. Have you been to the ER, urgent care clinic since your last visit? Hospitalized since your last visit? No    2. Have you seen or consulted any other health care providers outside of the [address] [place] [address] since your last visit? Include any pap smears or colon screening.  No    Learning Assessment 5/21/2018   PRIMARY LEARNER Patient   HIGHEST LEVEL OF EDUCATION - PRIMARY LEARNER  GRADUATED HIGH SCHOOL OR GED   BARRIERS PRIMARY LEARNER NONE   CO-LEARNER CAREGIVER No   PRIMARY LANGUAGE ENGLISH   LEARNER PREFERENCE PRIMARY DEMONSTRATION   LEARNING SPECIAL TOPICS nop   ANSWERED BY pt   RELATIONSHIP SELF

## 2019-04-19 NOTE — PROGRESS NOTES
Saud Gary is a 52 y.o. female   Chief Complaint   Patient presents with    Cough    Nasal Congestion    Pt here to discuss her cough and states is not any better. Pt states she had the CT sinuses at [address] and does not have the result. Pt has cut back on smoking. She is smoking a pack a week. Pt did not get the dymista, not covered. She is using a nasal spray and her allergy med zyrtec. Pt also with chest pain just off sternal border. No radiation. she is a 52y.o. year old female who presents for evalution. Reviewed PmHx, RxHx, FmHx, SocHx, AllgHx and updated and dated in the chart. Review of Systems - negative except as listed above in the HPI    Objective:     Vitals:    04/19/19 1013   BP: 123/88   Pulse: 67   Resp: 16   Temp: 98.5 °F (36.9 °C)   TempSrc: Oral   SpO2: 96%   Weight: 277 lb (125.6 kg)   Height: 5' 2\" (1.575 m)       Current Outpatient Medications   Medication Sig    nabumetone (RELAFEN) 500 mg tablet Take  by mouth two (2) times a day.  pantoprazole (PROTONIX) 40 mg tablet Take 1 Tab by mouth daily.  zolpidem (AMBIEN) 10 mg tablet Take 1 Tab by mouth nightly as needed for Sleep. Max Daily Amount: 10 mg.    cetirizine (ZYRTEC) 10 mg tablet Take 1 Tab by mouth daily.  azelastine-fluticasone (DYMISTA) 137-50 mcg/spray spry 1 Spray by Nasal route two (2) times a day.  methylPREDNISolone (MEDROL DOSEPACK) 4 mg tablet 12 day course    EPINEPHrine (EPIPEN) 0.3 mg/0.3 mL injection ADMINISTER 0.3 ML IN THE MUSCLE 1 TIME AS NEEDED    hydroCHLOROthiazide (HYDRODIURIL) 25 mg tablet TAKE 1 TABLET BY MOUTH DAILY    citalopram (CELEXA) 20 mg tablet TAKE 1 TABLET BY MOUTH DAILY    potassium chloride SR (KLOR-CON 10) 10 mEq tablet TAKE 2 TABLETS BY MOUTH DAILY    CHOLECALCIFEROL, VITAMIN D3, (VITAMIN D3 PO) Take 1 Cap by mouth daily.  topiramate (TOPAMAX) 100 mg tablet Take 100 mg by mouth daily (with breakfast).     topiramate (TOPAMAX) 100 mg tablet Take 200 mg by mouth nightly.  rizatriptan (MAXALT-MLT) 10 mg disintegrating tablet Take 10 mg by mouth once as needed for Migraine.  pseudoephedrine (SUDAFED) 30 mg tablet Take 60 mg by mouth every six (6) hours as needed for Congestion.  verapamil ER (VERELAN) 180 mg CR capsule Take 180 mg by mouth nightly.  albuterol-ipratropium (DUO-NEB) 2.5 mg-0.5 mg/3 ml nebu 3 mL by Nebulization route.  guaiFENesin-codeine (ROBITUSSIN AC) 100-10 mg/5 mL solution Take 5 mL by mouth three (3) times daily as needed for Cough.  ondansetron hcl (ZOFRAN, AS HYDROCHLORIDE,) 4 mg tablet Take 4 mg by mouth every eight (8) hours as needed for Nausea.  triamcinolone acetonide (KENALOG) 0.5 % ointment Apply  to affected area as needed for Skin Irritation. use thin layer    multivitamin (ONE A DAY) tablet Take 1 Tab by mouth daily.  SPIRIVA RESPIMAT 1.25 mcg/actuation inhaler INL 2 PUFFS PO ONCE Day    ADVAIR DISKUS 500-50 mcg/dose diskus inhaler 1 Puff two (2) times a day. Breakfast & bedtime  Indications: MAINTENANCE THERAPY FOR ASTHMA    meclizine (ANTIVERT) 12.5 mg tablet Take 1 Tab by mouth three (3) times daily as needed for Dizziness or Nausea.  PROAIR HFA 90 mcg/actuation inhaler as needed.  XOLAIR 150 mg solr solution every thirty (30) days. Pt does not know dose   Takes an injection every 30 days    montelukast (SINGULAIR) 10 mg tablet Take 10 mg by mouth nightly.  calcium 500 mg Tab Take  by mouth two (2) times a day.  ferrous sulfate 325 mg (65 mg iron) tablet Take  by mouth two (2) times a day.  albuterol (PROVENTIL) 5 mg/mL nebulizer solution by Nebulization route every six (6) hours as needed. No current facility-administered medications for this visit.         Physical Examination: General appearance - alert, well appearing, and in no distress  Eyes - pupils equal and reactive, extraocular eye movements intact  Ears - bilateral TM's and external ear canals normal  Nose - mucosal congestion and clear rhinorrhea  Mouth - mucous membranes moist, pharynx normal without lesions  Neck - supple, no significant adenopathy  Chest - clear to auscultation, no wheezes, rales or rhonchi, symmetric air entry  Heart - normal rate, regular rhythm, normal S1, S2, no murmurs, rubs, clicks or gallops      Assessment/ Plan:   Diagnoses and all orders for this visit:    1. Gastroesophageal reflux disease, esophagitis presence not specified  -     pantoprazole (PROTONIX) 40 mg tablet; Take 1 Tab by mouth daily. 2. Acute costochondritis  Use nsaid  3. Cough  Suspect more allergy vs reflux will change reflux med since pt states not helping her reflux and will request CT sinuses from [facility]. Work on cessation of smoking. Follow-up and Dispositions    · Return if symptoms worsen or fail to improve. I have discussed the diagnosis with the patient and the intended plan as seen in the above orders. The patient has received an after-visit summary and questions were answered concerning future plans. Pt conveyed understanding of plan.     Medication Side Effects and Warnings were discussed with patient      Kristine Cabrales DO

## 2019-04-19 NOTE — PATIENT INSTRUCTIONS

## 2019-04-30 RX ORDER — HYDROCHLOROTHIAZIDE 25 MG/1
TABLET ORAL
Qty: 90 TAB | Refills: 3 | Status: SHIPPED | OUTPATIENT
Start: 2019-04-30

## 2019-04-30 RX ORDER — CITALOPRAM 20 MG/1
TABLET, FILM COATED ORAL
Qty: 90 TAB | Refills: 3 | Status: SHIPPED | OUTPATIENT
Start: 2019-04-30

## 2019-08-15 ENCOUNTER — DOCUMENTATION ONLY (OUTPATIENT)
Dept: FAMILY MEDICINE CLINIC | Age: 49
End: 2019-08-15

## 2019-09-12 NOTE — H&P
52 y.o. female presents for diagnostic EGD and colonoscopy for GERD, upper abdominal pain, nausea, change in bowel habits, and personal history of colon polyps. Additional H&P data will be attached on the day of procedure.     Lenka Jonas MD

## 2019-09-13 ENCOUNTER — ANESTHESIA EVENT (OUTPATIENT)
Dept: ENDOSCOPY | Age: 49
End: 2019-09-13
Payer: COMMERCIAL

## 2019-09-13 ENCOUNTER — HOSPITAL ENCOUNTER (OUTPATIENT)
Age: 49
Setting detail: OUTPATIENT SURGERY
Discharge: HOME | End: 2019-09-13
Attending: INTERNAL MEDICINE | Admitting: INTERNAL MEDICINE
Payer: COMMERCIAL

## 2019-09-13 ENCOUNTER — ANESTHESIA (OUTPATIENT)
Dept: ENDOSCOPY | Age: 49
End: 2019-09-13
Payer: COMMERCIAL

## 2019-09-13 VITALS
WEIGHT: 271.17 LBS | DIASTOLIC BLOOD PRESSURE: 90 MMHG | BODY MASS INDEX: 49.9 KG/M2 | OXYGEN SATURATION: 97 % | TEMPERATURE: 97.7 F | HEART RATE: 57 BPM | RESPIRATION RATE: 17 BRPM | HEIGHT: 62 IN | SYSTOLIC BLOOD PRESSURE: 138 MMHG

## 2019-09-13 PROCEDURE — 74011250636 HC RX REV CODE- 250/636: Performed by: INTERNAL MEDICINE

## 2019-09-13 PROCEDURE — 77030021593 HC FCPS BIOP ENDOSC BSC -A: Performed by: INTERNAL MEDICINE

## 2019-09-13 PROCEDURE — 88305 TISSUE EXAM BY PATHOLOGIST: CPT

## 2019-09-13 PROCEDURE — 74011250636 HC RX REV CODE- 250/636: Performed by: NURSE ANESTHETIST, CERTIFIED REGISTERED

## 2019-09-13 PROCEDURE — 76060000032 HC ANESTHESIA 0.5 TO 1 HR: Performed by: INTERNAL MEDICINE

## 2019-09-13 PROCEDURE — 76040000007: Performed by: INTERNAL MEDICINE

## 2019-09-13 PROCEDURE — 74011000250 HC RX REV CODE- 250: Performed by: NURSE ANESTHETIST, CERTIFIED REGISTERED

## 2019-09-13 PROCEDURE — 77030013992 HC SNR POLYP ENDOSC BSC -B: Performed by: INTERNAL MEDICINE

## 2019-09-13 RX ORDER — SODIUM CHLORIDE 0.9 % (FLUSH) 0.9 %
5-40 SYRINGE (ML) INJECTION EVERY 8 HOURS
Status: DISCONTINUED | OUTPATIENT
Start: 2019-09-13 | End: 2019-09-13 | Stop reason: HOSPADM

## 2019-09-13 RX ORDER — LIDOCAINE HYDROCHLORIDE 20 MG/ML
INJECTION, SOLUTION EPIDURAL; INFILTRATION; INTRACAUDAL; PERINEURAL AS NEEDED
Status: DISCONTINUED | OUTPATIENT
Start: 2019-09-13 | End: 2019-09-13 | Stop reason: HOSPADM

## 2019-09-13 RX ORDER — NALOXONE HYDROCHLORIDE 0.4 MG/ML
0.4 INJECTION, SOLUTION INTRAMUSCULAR; INTRAVENOUS; SUBCUTANEOUS
Status: DISCONTINUED | OUTPATIENT
Start: 2019-09-13 | End: 2019-09-13 | Stop reason: HOSPADM

## 2019-09-13 RX ORDER — FLUMAZENIL 0.1 MG/ML
0.2 INJECTION INTRAVENOUS
Status: DISCONTINUED | OUTPATIENT
Start: 2019-09-13 | End: 2019-09-13 | Stop reason: HOSPADM

## 2019-09-13 RX ORDER — PROPOFOL 10 MG/ML
INJECTION, EMULSION INTRAVENOUS
Status: DISCONTINUED | OUTPATIENT
Start: 2019-09-13 | End: 2019-09-13 | Stop reason: HOSPADM

## 2019-09-13 RX ORDER — ATROPINE SULFATE 0.1 MG/ML
0.5 INJECTION INTRAVENOUS
Status: DISCONTINUED | OUTPATIENT
Start: 2019-09-13 | End: 2019-09-13 | Stop reason: HOSPADM

## 2019-09-13 RX ORDER — PROPOFOL 10 MG/ML
INJECTION, EMULSION INTRAVENOUS AS NEEDED
Status: DISCONTINUED | OUTPATIENT
Start: 2019-09-13 | End: 2019-09-13 | Stop reason: HOSPADM

## 2019-09-13 RX ORDER — EPINEPHRINE 0.1 MG/ML
1 INJECTION INTRACARDIAC; INTRAVENOUS
Status: DISCONTINUED | OUTPATIENT
Start: 2019-09-13 | End: 2019-09-13 | Stop reason: HOSPADM

## 2019-09-13 RX ORDER — SODIUM CHLORIDE 9 MG/ML
50 INJECTION, SOLUTION INTRAVENOUS CONTINUOUS
Status: DISCONTINUED | OUTPATIENT
Start: 2019-09-13 | End: 2019-09-13 | Stop reason: HOSPADM

## 2019-09-13 RX ORDER — DEXTROMETHORPHAN/PSEUDOEPHED 2.5-7.5/.8
1.2 DROPS ORAL
Status: DISCONTINUED | OUTPATIENT
Start: 2019-09-13 | End: 2019-09-13 | Stop reason: HOSPADM

## 2019-09-13 RX ORDER — SODIUM CHLORIDE 0.9 % (FLUSH) 0.9 %
5-40 SYRINGE (ML) INJECTION AS NEEDED
Status: DISCONTINUED | OUTPATIENT
Start: 2019-09-13 | End: 2019-09-13 | Stop reason: HOSPADM

## 2019-09-13 RX ADMIN — LIDOCAINE HYDROCHLORIDE 40 MG: 20 INJECTION, SOLUTION INTRAVENOUS at 13:10

## 2019-09-13 RX ADMIN — PROPOFOL 20 MG: 10 INJECTION, EMULSION INTRAVENOUS at 13:11

## 2019-09-13 RX ADMIN — PROPOFOL 140 MCG/KG/MIN: 10 INJECTION, EMULSION INTRAVENOUS at 13:10

## 2019-09-13 RX ADMIN — SODIUM CHLORIDE: 9 INJECTION, SOLUTION INTRAVENOUS at 12:45

## 2019-09-13 RX ADMIN — PROPOFOL 80 MG: 10 INJECTION, EMULSION INTRAVENOUS at 13:10

## 2019-09-13 NOTE — ROUTINE PROCESS
Estefania Murillo  1970  [id number]    Situation:  Verbal report received from: Jaylyn  Procedure: Procedure(s):  COLONOSCOPY  ESOPHAGOGASTRODUODENOSCOPY (EGD)  ESOPHAGOGASTRODUODENAL (EGD) BIOPSY  ENDOSCOPIC POLYPECTOMY  COLON BIOPSY    Background:    Preoperative diagnosis: GASTROESOPHAGEAL REFLUX DISEASE , FAMILY HISTORY OF MALIGNANT NEOPLASM OF GASTROINTESTINAL TRACT, ABDOMINAL PAIN EPIGASTRIC , PERSONAL HISTORY OF COLONIC POLYPS  Postoperative diagnosis: EGD-normal  Cecum polyps  transverse colon polyps  diverticulosis    :  Dr. Stefano Jang  Assistant(s): Endoscopy Technician-1: [facility]  Endoscopy RN-1: Aida Kilgore RN    Specimens:   ID Type Source Tests Collected by Time Destination   1 : duodenum biopsy Preservative Duodenum  Danny Lee MD 9/13/2019 1318 Pathology   2 : random gastric biopsy Preservative   Danny Lee MD 9/13/2019 1322 Pathology   3 : cecum polyps Preservative Cecum  Danny Lee MD 9/13/2019 1331 Pathology   4 : Random colon biopsies Preservative   Danny Lee MD 9/13/2019 1334 Pathology   5 : transverse colon polyps Preservative Colon, Transverse  Danny Lee MD 9/13/2019 1338 Pathology     H. Pylori  no    Assessment:  Intra-procedure medications     Anesthesia gave intra-procedure sedation and medications, see anesthesia flow sheet yes    Intravenous fluids: NS@ KVO     Vital signs stable     Abdominal assessment: round and soft     Recommendation:  Discharge patient per MD order.   Family or Friend   Permission to share finding with family or friend yes

## 2019-09-13 NOTE — DISCHARGE INSTRUCTIONS
[address] Duncan MD  [phone]      September 13, 2019     Rodriguez Tinajero  YOB: 1970    ENDOSCOPY DISCHARGE INSTRUCTIONS    If there is redness at IV site you should apply warm compress to area. If redness or soreness persist contact Dr. Lynda Rojas or your primary care doctor. Gaseous discomfort may develop, but walking, belching will help relieve this. You may not operate a vehicle for 12 hours  You may not operate machinery or dangerous appliances for rest of today  You may not drink alcoholic beverages for 12 hours  Avoid making any critical decisions for 24 hours    DIET:  You may resume your normal diet, but some patients find that heavy or large meals may lead to indigestion or vomiting. I suggest a light meal as first food intake. MEDICATIONS:  The use of some over-the-counter pain medication may lead to bleeding after biopsies or other procedures you may have had done. Tylenol (also called acetaminophen) is safe to take and will not lead to bleeding. Based on the procedure you had today you may not safely take aspirin or aspirin-like products for the next seven (7) days. ACTIVITY:  You may resume your normal household activities, but it is recommended that you spend the remainder of the day resting -  avoid any strenuous activity. CALL DR. Dima Rivera OFFICE IF:  Increasing pain, nausea, vomiting  Abdominal distension (swelling)  Significant new or increased bleeding (oral or rectal)  Fever/Chills  Chest pain/shortness of breath                   Additional instructions:   Impressions:  EGD:  Normal upper endoscopy. Colonoscopy: Pan-colonic diverticulosis, four colon polyps removed, otherwise normal colonic mucosa with biopsies taken. Recommendations:   1. Follow up pathology results  2. Continue current medications  3.  Repeat colonoscopy in 3 years for surveillance     It was an honor to be your doctor today. Please let me or my office staff know if you have any feedback about today's procedure. Foster Marie MD                           [facility]  [address]. Eleni Balderas MD  [phone]      September 13, 2019    Lilibeth Horan  YOB: 1970    COLONOSCOPY DISCHARGE INSTRUCTIONS    If there is redness at IV site you should apply warm compress to area. If redness or soreness persist contact Dr. Jeremi Jackson office or your primary care doctor. There may be a slight amount of blood passed from the rectum. Gaseous discomfort may develop, but walking, belching will help relieve this. You may not operate a vehicle for 12 hours  You may not operate machinery or dangerous appliances for rest of today  You may not drink alcoholic beverages for 12 hours  Avoid making any critical decisions for 24 hours    DIET:  You may resume your normal diet, but some patients find that heavy or large meals may lead to indigestion or vomiting. I suggest a light meal as first food intake. MEDICATIONS:  The use of some over-the-counter pain medication may lead to bleeding after colon biopsies or polyp removal.  Tylenol (also called acetaminophen) is safe to take even if you have had colonoscopy with polyp removal.  Based on the procedure you had today you may not safely take aspirin or aspirin-like products for the next ten (10) days. Remember that Tylenol (also called acetaminophen) is safe to take after colonoscopy even if you have had biopsies or polyps removed. ACTIVITY:  You may resume your normal household activities, but it is recommended that you spend the remainder of the day resting -  avoid any strenuous activity.     CALL DR. Ismael Cotter OFFICE IF:  Increasing pain, nausea, vomiting  Abdominal distension (swelling)  Significant new or increased bleeding (oral or rectal)  Fever/Chills  Chest pain/shortness of breath                       Additional instructions:   Impressions:  EGD:  Normal upper endoscopy. Colonoscopy: Pan-colonic diverticulosis, four colon polyps removed, otherwise normal colonic mucosa with biopsies taken. Recommendations:   1. Follow up pathology results  2. Continue current medications  3. Repeat colonoscopy in 3 years for surveillance     It was an honor to be your doctor today. Please let me or my office staff know if you have any feedback about today's procedure. Alyssa Bryant MD    Colonoscopy saves lives, and can prevent colon cancer. Everyone aged 48 or older needs colonoscopy.   Tell your family and friends: get the test!

## 2019-09-13 NOTE — PERIOP NOTES
Received Report From Maria D Abbott CRNA @ [id number], see anesthesia notes. Care of the patient transferred to procedure nurse Cora Fontana RN @ [phone]    Out of Procedure and sent to post-recovery @ [phone]    Post-recovery report given to Spencer Soto RN @ [phone]    Patient ABD remains soft and non-tender post procedure. Pt has no complaints at this time and tolerated the procedure well. Endoscope was pre-cleaned at bedside immediately following procedure by Winter hBatt.

## 2019-09-13 NOTE — INTERVAL H&P NOTE
Pre-Endoscopy H&P Update  Chief complaint/HPI/ROS:  The indication for the procedure, the patient's history and the patient's current medications are reviewed prior to the procedure and that data is reported on the H&P to which this document is attached. Any significant complaints with regard to organ systems will be noted, and if not mentioned then a review of systems is not contributory. Past Medical History:   Diagnosis Date    Arthritis     Asthma     BPPV (benign paroxysmal positional vertigo)     Chronic pain     chronic migraines,     Diverticulitis     Endometriosis     Gastrointestinal disorder     reflux, IBS    GERD (gastroesophageal reflux disease)     Hypertension     BP us with migraines     Ill-defined condition     positional vertigo    Migraines     Obesity     ANY on CPAP     Seasonal allergic rhinitis     Vertigo       Past Surgical History:   Procedure Laterality Date    ABDOMEN SURGERY PROC UNLISTED      laparoscopic surgery x 3 for endometriosis    HX HEENT      removal foreign object from chin as child    HX HYSTERECTOMY  2017    HX ORTHOPAEDIC      ORIF right lower leg     Social   Social History     Tobacco Use    Smoking status: Light Tobacco Smoker     Packs/day: 0.10     Years: 10.00     Pack years: 1.00    Smokeless tobacco: Never Used    Tobacco comment: 3 cigarettes a day   Substance Use Topics    Alcohol use:  Yes     Alcohol/week: 3.0 standard drinks     Types: 3 Shots of liquor per week     Comment: social      Family History   Problem Relation Age of Onset    Stroke Father       Allergies   Allergen Reactions    Chocolate Flavor [Flavoring Agent] Anaphylaxis    Flagyl [Metronidazole] Hives, Itching and Swelling     Generalized itching, hives, eyes swelled    Hazelnut [Tree Nut] Anaphylaxis    Horse Leiter Anaphylaxis    Other Medication Anaphylaxis     NUTS    Peanut Anaphylaxis    Sunflower Oil Anaphylaxis    Amlodipine Itching     Eye swelling  Botox [Onabotulinumtoxina] Other (comments)    Celebrex [Celecoxib] Itching    Clindamycin Itching    Doxycycline Itching    Hydrocodone Hives and Itching     Eye swelling    Levofloxacin Hives, Shortness of Breath and Itching    Lortab [Hydrocodone-Acetaminophen] Itching    Mold Vertigo    Pcn [Penicillins] Itching     Patient tolerated graded Ancef challenge with out adverse side effects on 10/25/2017    Prednisone Itching and Other (comments)     Muscle spasms    Tolerated medrol does packs    Sesame Oil Hives, Shortness of Breath and Itching    Shellfish Containing Products Itching      Prior to Admission Medications   Prescriptions Last Dose Informant Patient Reported? Taking? ADVAIR DISKUS 500-50 mcg/dose diskus inhaler 2019 at Unknown time  Yes Yes   Si Puff two (2) times a day. Breakfast & bedtime  Indications: MAINTENANCE THERAPY FOR ASTHMA   CHOLECALCIFEROL, VITAMIN D3, (VITAMIN D3 PO) 2019  Yes No   Sig: Take 1 Cap by mouth daily. EPINEPHrine (EPIPEN) 0.3 mg/0.3 mL injection   No No   Sig: ADMINISTER 0.3 ML IN THE MUSCLE 1 TIME AS NEEDED   PROAIR HFA 90 mcg/actuation inhaler 2019  Yes No   Sig: as needed. SPIRIVA RESPIMAT 1.25 mcg/actuation inhaler 2019  Yes No   Sig: INL 2 PUFFS PO ONCE Day   XOLAIR 150 mg solr solution 2019 at Unknown time  Yes Yes   Sig: every thirty (30) days. Pt does not know dose   Takes an injection every 30 days   albuterol (PROVENTIL) 5 mg/mL nebulizer solution 2019  Yes No   Sig: by Nebulization route every six (6) hours as needed. albuterol-ipratropium (DUO-NEB) 2.5 mg-0.5 mg/3 ml nebu 2019  Yes No   Sig: 3 mL by Nebulization route. azelastine-fluticasone (DYMISTA) 137-50 mcg/spray spry 2019 at Unknown time  No Yes   Si Spray by Nasal route two (2) times a day. calcium 500 mg Tab 2019  Yes No   Sig: Take  by mouth two (2) times a day.      cetirizine (ZYRTEC) 10 mg tablet 2019  No No   Sig: Take 1 Tab by mouth daily. citalopram (CELEXA) 20 mg tablet 2019  No No   Sig: TAKE 1 TABLET BY MOUTH DAILY   ferrous sulfate 325 mg (65 mg iron) tablet   Yes No   Sig: Take  by mouth two (2) times a day. guaiFENesin-codeine (ROBITUSSIN AC) 100-10 mg/5 mL solution Unknown at Unknown time  Yes No   Sig: Take 5 mL by mouth three (3) times daily as needed for Cough. hydroCHLOROthiazide (HYDRODIURIL) 25 mg tablet 2019  No No   Sig: TAKE 1 TABLET BY MOUTH DAILY   meclizine (ANTIVERT) 12.5 mg tablet Unknown at Unknown time  No No   Sig: Take 1 Tab by mouth three (3) times daily as needed for Dizziness or Nausea. methylPREDNISolone (MEDROL DOSEPACK) 4 mg tablet Unknown at Unknown time  No No   Si day course   montelukast (SINGULAIR) 10 mg tablet 2019  Yes No   Sig: Take 10 mg by mouth nightly. multivitamin (ONE A DAY) tablet 2019  Yes No   Sig: Take 1 Tab by mouth daily. nabumetone (RELAFEN) 500 mg tablet 2019  Yes No   Sig: Take  by mouth two (2) times a day. ondansetron hcl (ZOFRAN, AS HYDROCHLORIDE,) 4 mg tablet Unknown at Unknown time  Yes No   Sig: Take 4 mg by mouth every eight (8) hours as needed for Nausea. pantoprazole (PROTONIX) 40 mg tablet 2019  No No   Sig: Take 1 Tab by mouth daily. potassium chloride SR (KLOR-CON 10) 10 mEq tablet 2019  No No   Sig: TAKE 2 TABLETS BY MOUTH DAILY   pseudoephedrine (SUDAFED) 30 mg tablet Not Taking at Unknown time  Yes No   Sig: Take 60 mg by mouth every six (6) hours as needed for Congestion. rizatriptan (MAXALT-MLT) 10 mg disintegrating tablet 2019  Yes No   Sig: Take 10 mg by mouth once as needed for Migraine. topiramate (TOPAMAX) 100 mg tablet 2019  Yes No   Sig: Take 100 mg by mouth daily (with breakfast). topiramate (TOPAMAX) 100 mg tablet 2019  Yes No   Sig: Take 200 mg by mouth nightly.    triamcinolone acetonide (KENALOG) 0.5 % ointment 2019  Yes No   Sig: Apply  to affected area as needed for Skin Irritation. use thin layer   verapamil ER (VERELAN) 180 mg CR capsule 9/11/2019  Yes No   Sig: Take 180 mg by mouth nightly. zolpidem (AMBIEN) 10 mg tablet Unknown at Unknown time  No No   Sig: Take 1 Tab by mouth nightly as needed for Sleep. Max Daily Amount: 10 mg. Facility-Administered Medications: None       PHYSICAL EXAM:  The patient is examined immediately prior to the procedure. Visit Vitals  /80   Pulse (!) 57   Temp 98.4 °F (36.9 °C)   Resp 17   Ht 5' 2\" (1.575 m)   Wt 123 kg (271 lb 2.7 oz)   SpO2 100%   Breastfeeding? No   BMI 49.60 kg/m²     Gen: Appears comfortable, no distress. Pulm: comfortable respirations with no abnormal audible breath sounds  HEART: well perfused, no abnormal audible heart sounds  GI: abdomen flat. PLAN:  Informed consent discussion held, patient afforded an opportunity to ask questions and all questions answered. After being advised of the risks, benefits, and alternatives, the patient requested that we proceed and indicated so on a written consent form. Will proceed with procedure as planned.   Abiola Szymanski MD

## 2019-09-13 NOTE — ANESTHESIA PREPROCEDURE EVALUATION
Relevant Problems   No relevant active problems       Anesthetic History   No history of anesthetic complications            Review of Systems / Medical History  Patient summary reviewed and pertinent labs reviewed    Pulmonary        Sleep apnea    Asthma        Neuro/Psych         Psychiatric history     Cardiovascular    Hypertension                   GI/Hepatic/Renal     GERD           Endo/Other        Morbid obesity and arthritis     Other Findings              Physical Exam    Airway  Mallampati: II    Neck ROM: normal range of motion   Mouth opening: Normal     Cardiovascular    Rhythm: regular  Rate: normal         Dental  No notable dental hx       Pulmonary  Breath sounds clear to auscultation               Abdominal  GI exam deferred       Other Findings            Anesthetic Plan    ASA: 3  Anesthesia type: MAC          Induction: Intravenous  Anesthetic plan and risks discussed with: Patient

## 2019-09-13 NOTE — PROCEDURES
[address] Dev Brandt MD  [phone]      2019    Esophagogastroduodenoscopy & Colonoscopy Procedure Note  Tino Lilly  : 1970  Jose Jang Medical Record Number: [id number]      Indications:   GERD, upper abdominal pain, nausea, change in bowel habits, personal history of colon polyps   Referring Physician:  Lester Fitzgerald MD  Anesthesia/Sedation: Conscious Sedation/Moderate Sedation/MAC  Endoscopist:  Dr. Yanna Quiroz  Complications:  None  Estimated Blood Loss:  None    Permit:  The indications, risks, benefits and alternatives were reviewed with the patient or their decision maker who was provided an opportunity to ask questions and all questions were answered. The specific risks of esophagogastroduodenoscopy and colonoscopy with conscious sedation were reviewed, including but not limited to anesthetic complication, bleeding, adverse drug reaction, missed lesion, infection, IV site reactions, and intestinal perforation which would lead to the need for surgical repair. Alternatives to EGD and colonoscopy including radiographic imaging, observation without testing, or laboratory testing were reviewed as well as the limitations of those alternatives discussed. After considering the options and having all their questions answered, the patient or their decision maker provided both verbal and written consent to proceed. -----------EGD------------   Procedure in Detail:  After obtaining informed consent, positioning of the patient in the left lateral decubitus position, and conduction of a pre-procedure pause or \"time out\" the endoscope was introduced into the mouth and advanced to the duodenum. A careful inspection was made, and findings or interventions are described below. Findings:   Esophagus: Normal without esophagitis. Stomach: Normal without ulcer. Patent pylorus. Random gastric biopsies taken. Duodenum/jejunum: Normal duodenum to the second portion. Random biopsies taken. ----------Colonoscopy-----------    Procedure in Detail:  After obtaining informed consent, positioning of the patient in the left lateral decubitus position, and conduction of a pre-procedure pause or \"time out\" the endoscope was introduced into the anus and advanced to the cecum, which was identified by the ileocecal valve and appendiceal orifice. The quality of the colonic preparation was good. A careful inspection was made as the colonoscope was withdrawn, findings and interventions are described below. Findings: Moderate pan-colonic diverticulosis. Two cecal polyps ranging from 3 mm to 10 mm in size were found. Completely removed and retrieved with cold snare polypectomy. Two transverse colon polyps ranging from 3 to 6 mm in size were found. Completely removed and retrieved with cold snare polypectomy. No inflammation seen throughout the colon. Random biopsies were taken for microscopic colitis.    ------------------------------  Specimens:    1. Random duodenal biopsies   2. Random gastric biopsies   3. Cecal polyps x 2   4. Random colon biopsies (from right and left colon)   5. Transverse colon polyps x 2    Complications:   None; patient tolerated the procedure well. Impressions:  EGD:  Normal upper endoscopy. Colonoscopy: Pan-colonic diverticulosis, four colon polyps removed, otherwise normal colonic mucosa with biopsies taken. Recommendations:   1. Follow up pathology results  2. Continue current medications  3. Repeat colonoscopy in 3 years for surveillance    Thank you for entrusting me with this patient's care. Please do not hesitate to contact me with any questions or if I can be of assistance with any of your other patients' GI needs. Signed By: Octavio Jacob MD                        September 13, 2019    Surgical assistant none.   Implants none unless specified.

## 2019-09-13 NOTE — ANESTHESIA POSTPROCEDURE EVALUATION
Procedure(s):  COLONOSCOPY  ESOPHAGOGASTRODUODENOSCOPY (EGD)  ESOPHAGOGASTRODUODENAL (EGD) BIOPSY  ENDOSCOPIC POLYPECTOMY  COLON BIOPSY. MAC    Anesthesia Post Evaluation      Multimodal analgesia: multimodal analgesia used between 6 hours prior to anesthesia start to PACU discharge  Patient location during evaluation: bedside  Patient participation: complete - patient participated  Level of consciousness: awake  Pain management: adequate  Airway patency: patent  Anesthetic complications: no  Cardiovascular status: acceptable  Respiratory status: acceptable  Hydration status: acceptable        Vitals Value Taken Time   /90 9/13/2019  2:07 PM   Temp 36.5 °C (97.7 °F) 9/13/2019  1:58 PM   Pulse 56 9/13/2019  2:12 PM   Resp 17 9/13/2019  2:12 PM   SpO2 100 % 9/13/2019  2:12 PM   Vitals shown include unvalidated device data.

## 2020-03-09 NOTE — PROCEDURES
[facility]  *** FINAL REPORT ***    Name: Phli Lopez  MRN: [id number]      Outpatient  : 02 Mar 1970  HIS Order #: [id number]  [address] [facility] Visit #: [id number]  Date: 22 Sep 2017    TYPE OF TEST: Cerebrovascular Duplex    REASON FOR TEST  Dizziness/vertigo, Head ache    Right Carotid:-             Proximal               Mid                 Distal  cm/s  Systolic  Diastolic  Systolic  Diastolic  Systolic  Diastolic  CCA:     23.1      12.0                            54.0      13.0  Bulb:  ECA:     69.0      13.0  ICA:     48.0      11.0       78.0      27.0       97.0      38.0  ICA/CCA:  0.9       0.8    ICA Stenosis: Normal    Right Vertebral:-  Finding: Antegrade  Sys:       38.0  Sabrina:       10.0    Right Subclavian:    Left Carotid:-            Proximal                Mid                 Distal  cm/s  Systolic  Diastolic  Systolic  Diastolic  Systolic  Diastolic  CCA:    574.7      22.0                            84.0      21.0  Bulb:  ECA:     67.0      12.0  ICA:     91.0      14.0       58.0      26.0       89.0      44.0  ICA/CCA:  1.1       0.7    ICA Stenosis: Normal    Left Vertebral:-  Finding: Antegrade  Sys:       46.0  Sabrina:       15.0    Left Subclavian:    INTERPRETATION/FINDINGS  PROCEDURE:  Evaluation of the extracranial cerebrovascular arteries  with ultrasound (B-mode imaging, pulsed Doppler, color Doppler). Includes the common carotid, internal carotid, external carotid, and  vertebral arteries. FINDINGS:  Grayscale imaging reveals no appreciable plaque formation  or flow disturbance within the proximal internal carotid arteries  bilaterally. The peak systolic velocities within the ICA's are normal   at 89 cm/s on the right and 100 cm/s on the left. Tortuosity is  noted within the ICA's bilaterally. IMPRESSION: Findings are consistent with 0-9% stenosis of the right  internal carotid and 0-9% stenosis of the left internal carotid.   Vertebrals are patent with antegrade flow. ADDITIONAL COMMENTS    I have personally reviewed the data relevant to the interpretation of  this  study.     TECHNOLOGIST: DILIP Bauer  Signed: 09/22/2017 12:03 PM    PHYSICIAN: Medina Payne MD, [facility]  Signed: 09/22/2017 02:22 PM 36.9

## 2020-05-06 DIAGNOSIS — F51.01 PRIMARY INSOMNIA: ICD-10-CM

## 2020-05-06 RX ORDER — ZOLPIDEM TARTRATE 10 MG/1
TABLET ORAL
Qty: 30 TAB | OUTPATIENT
Start: 2020-05-06

## 2020-07-30 DIAGNOSIS — J30.9 CHRONIC ALLERGIC RHINITIS: ICD-10-CM

## 2020-07-30 RX ORDER — CETIRIZINE HCL 10 MG
TABLET ORAL
Qty: 90 TAB | Refills: 3 | OUTPATIENT
Start: 2020-07-30

## 2022-03-18 PROBLEM — E66.01 OBESITY, MORBID (HCC): Status: ACTIVE | Noted: 2018-05-21

## 2022-03-19 PROBLEM — R42 VERTIGO: Status: ACTIVE | Noted: 2018-05-21

## 2022-03-20 PROBLEM — N80.9 ENDOMETRIOSIS: Status: ACTIVE | Noted: 2017-10-25

## 2022-08-15 ENCOUNTER — TRANSCRIBE ORDER (OUTPATIENT)
Dept: SCHEDULING | Age: 52
End: 2022-08-15

## 2022-08-15 DIAGNOSIS — M47.816 LUMBAR SPONDYLOSIS: ICD-10-CM

## 2022-08-15 DIAGNOSIS — M51.36 DDD (DEGENERATIVE DISC DISEASE), LUMBAR: Primary | ICD-10-CM

## 2022-08-22 ENCOUNTER — HOSPITAL ENCOUNTER (OUTPATIENT)
Dept: MRI IMAGING | Age: 52
Discharge: HOME | End: 2022-08-22
Attending: PHYSICAL MEDICINE & REHABILITATION
Payer: MEDICAID

## 2022-08-22 DIAGNOSIS — M47.816 LUMBAR SPONDYLOSIS: ICD-10-CM

## 2022-08-22 DIAGNOSIS — M51.36 DDD (DEGENERATIVE DISC DISEASE), LUMBAR: ICD-10-CM

## 2022-08-22 PROCEDURE — 72148 MRI LUMBAR SPINE W/O DYE: CPT

## 2023-01-15 ENCOUNTER — OFFICE VISIT (OUTPATIENT)
Dept: EMERGENCY DEPT | Age: 53
End: 2023-01-15
Attending: EMERGENCY MEDICINE
Payer: MEDICAID

## 2023-01-15 ENCOUNTER — HOSPITAL ENCOUNTER (EMERGENCY)
Age: 53
Discharge: HOME | End: 2023-01-15
Attending: EMERGENCY MEDICINE
Payer: MEDICAID

## 2023-01-15 VITALS
HEIGHT: 63 IN | OXYGEN SATURATION: 97 % | WEIGHT: 284 LBS | RESPIRATION RATE: 16 BRPM | DIASTOLIC BLOOD PRESSURE: 108 MMHG | BODY MASS INDEX: 50.32 KG/M2 | TEMPERATURE: 98.6 F | HEART RATE: 88 BPM | SYSTOLIC BLOOD PRESSURE: 174 MMHG

## 2023-01-15 DIAGNOSIS — L02.91 ABSCESS: Primary | ICD-10-CM

## 2023-01-15 PROCEDURE — 99284 EMERGENCY DEPT VISIT MOD MDM: CPT

## 2023-01-15 PROCEDURE — 75810000289 HC I&D ABSCESS SIMP/COMP/MULT

## 2023-01-15 PROCEDURE — 74011000250 HC RX REV CODE- 250: Performed by: STUDENT IN AN ORGANIZED HEALTH CARE EDUCATION/TRAINING PROGRAM

## 2023-01-15 RX ORDER — FLUCONAZOLE 150 MG/1
150 TABLET ORAL
Qty: 2 TABLET | Refills: 0 | Status: SHIPPED | OUTPATIENT
Start: 2023-01-15 | End: 2023-01-15 | Stop reason: SDUPTHER

## 2023-01-15 RX ORDER — LIDOCAINE HYDROCHLORIDE 10 MG/ML
10 INJECTION INFILTRATION; PERINEURAL ONCE
Status: COMPLETED | OUTPATIENT
Start: 2023-01-15 | End: 2023-01-15

## 2023-01-15 RX ORDER — CEPHALEXIN 500 MG/1
500 CAPSULE ORAL 4 TIMES DAILY
Qty: 28 CAPSULE | Refills: 0 | Status: SHIPPED | OUTPATIENT
Start: 2023-01-15 | End: 2023-01-22

## 2023-01-15 RX ORDER — FLUCONAZOLE 150 MG/1
150 TABLET ORAL
Qty: 2 TABLET | Refills: 0 | Status: SHIPPED | OUTPATIENT
Start: 2023-01-15 | End: 2023-01-15

## 2023-01-15 RX ADMIN — LIDOCAINE HYDROCHLORIDE 10 ML: 10 INJECTION, SOLUTION INFILTRATION; PERINEURAL at 14:53

## 2023-01-15 NOTE — ED PROVIDER NOTES
HPI   Patient is a 46 y.o. F who presents today with complaints of suspected abscess on her abdomen. Reports for the last week, she has had a bump on her abdomen that has progressively worsened. She has not been taking anything for the pain. Denies any fevers, chills, vomiting.     PMH: Hypertension, obesity, IBS, GERD  Surgical History: See below  Smoking: None  Alcohol: None  Drug Use: None  ALLERGIES: Chocolate flavor [flavoring agent], Flagyl [metronidazole], Hazelnut [tree nut], Horse chestnut, Other medication, Peanut, Sunflower oil, Amlodipine, Botox [onabotulinumtoxina], Celebrex [celecoxib], Clindamycin, Doxycycline, Hydrocodone, Levofloxacin, Lortab [hydrocodone-acetaminophen], Mold, Pcn [penicillins], Prednisone, Sesame oil, and Shellfish containing products    Past Medical History:   Diagnosis Date    Arthritis     Asthma     BPPV (benign paroxysmal positional vertigo)     Chronic pain     chronic migraines,     Diverticulitis     Endometriosis     Gastrointestinal disorder     reflux, IBS    GERD (gastroesophageal reflux disease)     Hypertension     BP us with migraines     Ill-defined condition     positional vertigo    Migraines     Obesity     ANY on CPAP     Seasonal allergic rhinitis     Vertigo      Past Surgical History:   Procedure Laterality Date    ABDOMEN SURGERY PROC UNLISTED      laparoscopic surgery x 3 for endometriosis    COLONOSCOPY N/A 9/13/2019    COLONOSCOPY performed by Shaniqua Tena MD at [facility] ENDOSCOPY    HX HEENT      removal foreign object from chin as child    HX HYSTERECTOMY  2017    HX ORTHOPAEDIC      ORIF right lower leg     Family History:   Problem Relation Age of Onset    Stroke Father      Social History     Socioeconomic History    Marital status: SINGLE     Spouse name: Not on file    Number of children: Not on file    Years of education: Not on file    Highest education level: Not on file   Occupational History    Not on file   Tobacco Use    Smoking status: Light Smoker     Packs/day: 0.10     Years: 10.00     Pack years: 1.00     Types: Cigarettes    Smokeless tobacco: Never    Tobacco comments:     3 cigarettes a day   Substance and Sexual Activity    Alcohol use: Yes     Alcohol/week: 3.0 standard drinks     Types: 3 Shots of liquor per week     Comment: social    Drug use: Yes     Types: Marijuana     Comment: uses for her migraines    Sexual activity: Yes     Birth control/protection: Condom   Other Topics Concern    Not on file   Social History Narrative    Not on file     Social Determinants of Health     Financial Resource Strain: Not on file   Food Insecurity: Not on file   Transportation Needs: Not on file   Physical Activity: Not on file   Stress: Not on file   Social Connections: Not on file   Intimate Partner Violence: Not on file   Housing Stability: Not on file           Review of Systems   Constitutional:  Negative for fever. Gastrointestinal:  Negative for nausea and vomiting. Skin:  Negative for rash.          + Abscess on abdomen   Neurological:  Negative for weakness. Vitals:    01/15/23 1437   BP: (!) 174/108   Pulse: 88   Resp: 16   Temp: 98.6 °F (37 °C)   SpO2: 97%   Weight: 128.8 kg (284 lb)   Height: 5' 3\" (1.6 m)            Physical Exam  Constitutional:       General: She is not in acute distress. Appearance: She is obese. She is not ill-appearing, toxic-appearing or diaphoretic. Abdominal:          Comments: + 2 and half centimeter by 1 and half centimeter fluctuant abscess with surrounding warmth and erythema   Neurological:      Mental Status: She is alert. LABORATORY RESULTS:  No results found for this or any previous visit (from the past 24 hour(s)). IMAGING RESULTS:  No results found.     MEDICATIONS GIVEN:  Medications   lidocaine (XYLOCAINE) 10 mg/mL (1 %) injection 10 mL (10 mL IntraDERMal Given by Provider 1/15/23 1453)            [facility]  I&D Abcess Simple    Date/Time: 1/15/2023 4:36 PM  Performed by: Carmelita Burroughs Kaylan Wlash PA-C  Authorized by: Aaliyah Jett PA-C     Consent:     Consent obtained:  Verbal    Consent given by:  Patient    Risks discussed:  Bleeding, incomplete drainage and infection    Alternatives discussed:  No treatment  Location:     Type:  Abscess    Size:  2.5 x 1.5 cm    Location:  Trunk    Trunk location:  Abdomen  Pre-procedure details:     Skin preparation:  Chlorhexidine  Anesthesia:     Anesthesia method:  Local infiltration    Local anesthetic:  Lidocaine 1% w/o epi  Procedure type:     Complexity:  Simple  Procedure details:     Incision types:  Stab incision    Incision depth:  Dermal    Wound management:  Probed and deloculated and irrigated with saline    Drainage:  Bloody    Drainage amount: Moderate    Wound treatment:  Wound left open  Post-procedure details:     Procedure completion:  Tolerated well, no immediate complications  Comments:      Discharged with Keflex for surrounding soft tissue cellulitis           Discussed results and work-up with patient and answered all questions, the patient expresses understanding and agrees with the care plan and disposition. The patient was given an opportunity to ask questions and all concerns raised were addressed prior to discharge. Recommended patient follow-up with provider as listed below. Counseled patient on standard home and self-care measures. Specifically explained the emergent conditions that could arise and clearly instructed the patient to return to the emergency department for those and any other new, worsening, or concerning symptoms. Patient stable and ready for discharge. History, exam, medical decision making, and plan discussed with ED attending, Dr. Troy Montez. IMPRESSION:  1.  Abscess        DISPOSITION:  Discharge    PLAN:  Follow-up Information       Follow up With Specialties Details Why Contact Info    Jmaes Fitzgerald MD Family Medicine Schedule an appointment as soon as possible for a visit   [facility] [address]  [address] [place] [address]  [phone]            Discharge Medication List as of 1/15/2023  3:36 PM        START taking these medications    Details   cephALEXin (Keflex) 500 mg capsule Take 1 Capsule by mouth four (4) times daily for 7 days. , Normal, Disp-28 Capsule, R-0           CONTINUE these medications which have NOT CHANGED    Details   citalopram (CELEXA) 20 mg tablet TAKE 1 TABLET BY MOUTH DAILY, Normal, Disp-90 Tab, R-3      hydroCHLOROthiazide (HYDRODIURIL) 25 mg tablet TAKE 1 TABLET BY MOUTH DAILY, Normal, Disp-90 Tab, R-3      nabumetone (RELAFEN) 500 mg tablet Take  by mouth two (2) times a day., Historical Med      pantoprazole (PROTONIX) 40 mg tablet Take 1 Tab by mouth daily. , Normal, Disp-30 Tab, R-5      zolpidem (AMBIEN) 10 mg tablet Take 1 Tab by mouth nightly as needed for Sleep. Max Daily Amount: 10 mg., Print, Disp-30 Tab, R-5      cetirizine (ZYRTEC) 10 mg tablet Take 1 Tab by mouth daily. , Normal, Disp-90 Tab, R-3      azelastine-fluticasone (DYMISTA) 137-50 mcg/spray spry 1 Spray by Nasal route two (2) times a day., Normal, Disp-23 g, R-11      methylPREDNISolone (MEDROL DOSEPACK) 4 mg tablet 12 day course, Normal, Disp-2 Dose Pack, R-0      EPINEPHrine (EPIPEN) 0.3 mg/0.3 mL injection ADMINISTER 0.3 ML IN THE MUSCLE 1 TIME AS NEEDED, Normal, Disp-2 Syringe, R-0      potassium chloride SR (KLOR-CON 10) 10 mEq tablet TAKE 2 TABLETS BY MOUTH DAILY, Normal, Disp-180 Tab, R-3      CHOLECALCIFEROL, VITAMIN D3, (VITAMIN D3 PO) Take 1 Cap by mouth daily. , Historical Med      !! topiramate (TOPAMAX) 100 mg tablet Take 100 mg by mouth daily (with breakfast). , Historical Med      !! topiramate (TOPAMAX) 100 mg tablet Take 200 mg by mouth nightly., Historical Med      rizatriptan (MAXALT-MLT) 10 mg disintegrating tablet Take 10 mg by mouth once as needed for Migraine., Historical Med      pseudoephedrine (SUDAFED) 30 mg tablet Take 60 mg by mouth every six (6) hours as needed for Congestion. , Historical Med      verapamil ER (VERELAN) 180 mg CR capsule Take 180 mg by mouth nightly., Historical Med      albuterol-ipratropium (DUO-NEB) 2.5 mg-0.5 mg/3 ml nebu 3 mL by Nebulization route., Historical Med      guaiFENesin-codeine (ROBITUSSIN AC) 100-10 mg/5 mL solution Take 5 mL by mouth three (3) times daily as needed for Cough., Historical Med      ondansetron hcl (ZOFRAN, AS HYDROCHLORIDE,) 4 mg tablet Take 4 mg by mouth every eight (8) hours as needed for Nausea., Historical Med      triamcinolone acetonide (KENALOG) 0.5 % ointment Apply  to affected area as needed for Skin Irritation. use thin layer, Historical Med      multivitamin (ONE A DAY) tablet Take 1 Tab by mouth daily. , Historical Med      SPIRIVA RESPIMAT 1.25 mcg/actuation inhaler INL 2 PUFFS PO ONCE Day, Historical Med, R-6, MADHURI      ADVAIR DISKUS 500-50 mcg/dose diskus inhaler 1 Puff two (2) times a day. Breakfast & bedtime  Indications: MAINTENANCE THERAPY FOR ASTHMA, Historical Med, MADHURI      meclizine (ANTIVERT) 12.5 mg tablet Take 1 Tab by mouth three (3) times daily as needed for Dizziness or Nausea., Normal, Disp-60 Tab, R-1      PROAIR HFA 90 mcg/actuation inhaler as needed., Historical Med, R-3      XOLAIR 150 mg solr solution every thirty (30) days. Pt does not know dose   Takes an injection every 30 days, Historical Med      montelukast (SINGULAIR) 10 mg tablet Take 10 mg by mouth nightly., Historical Med      calcium 500 mg Tab Take  by mouth two (2) times a day.  , Historical Med      ferrous sulfate 325 mg (65 mg iron) tablet Take  by mouth two (2) times a day.  , Historical Med      albuterol (PROVENTIL) 5 mg/mL nebulizer solution by Nebulization route every six (6) hours as needed.  , Historical Med       !! - Potential duplicate medications found. Please discuss with provider. Please note that this dictation was completed with [email or web address], the [employer] voice recognition software.   Quite often unanticipated grammatical, syntax, homophones, and other interpretive errors are inadvertently transcribed by the computer software. Please disregard these errors. Please excuse any errors that have escaped final proofreading.

## 2023-01-15 NOTE — ED TRIAGE NOTES
Pt to ER with c/o boil on her abd x1 week. Pt denies any drainage from the area. Pt reports \"it feels like its about to burst\". Pt currently changing into gown.

## 2023-04-29 RX ORDER — TOPIRAMATE 100 MG/1
200 TABLET, FILM COATED ORAL NIGHTLY
COMMUNITY

## 2024-02-08 NOTE — LETTER
NOTIFICATION RETURN TO WORK / SCHOOL 
 
8/14/2017 4:27 PM 
 
Ms. Pito Triplett 
[facility] [address] [facility] [address] To Whom It May Concern: 
 
Pito Triplett is currently under the care of [address]. She will return to work/school on: August 14, 2017 If there are questions or concerns please have the patient contact our office. Sincerely, Evelyne Floyd MD 
 
                                
 
 [Lower Ext Edema] : lower extremity edema [Negative] : Heme/Lymph [Chest Pain] : no chest pain [Palpitations] : no palpitations [de-identified] : + wounds to bilat LE, healing, no weeping or drainage

## 2024-04-15 ENCOUNTER — ANESTHESIA (OUTPATIENT)
Facility: HOSPITAL | Age: 54
End: 2024-04-15
Payer: MEDICARE

## 2024-04-15 ENCOUNTER — ANESTHESIA EVENT (OUTPATIENT)
Facility: HOSPITAL | Age: 54
End: 2024-04-15
Payer: MEDICARE

## 2024-04-15 ENCOUNTER — HOSPITAL ENCOUNTER (OUTPATIENT)
Facility: HOSPITAL | Age: 54
Setting detail: OUTPATIENT SURGERY
Discharge: HOME | End: 2024-04-15
Attending: INTERNAL MEDICINE | Admitting: INTERNAL MEDICINE
Payer: MEDICARE

## 2024-04-15 VITALS
WEIGHT: 280.8 LBS | TEMPERATURE: 98.2 F | DIASTOLIC BLOOD PRESSURE: 93 MMHG | BODY MASS INDEX: 51.67 KG/M2 | RESPIRATION RATE: 19 BRPM | HEART RATE: 65 BPM | OXYGEN SATURATION: 89 % | HEIGHT: 62 IN | SYSTOLIC BLOOD PRESSURE: 111 MMHG

## 2024-04-15 PROCEDURE — 3600007502: Performed by: INTERNAL MEDICINE

## 2024-04-15 PROCEDURE — 2500000003 HC RX 250 WO HCPCS: Performed by: NURSE ANESTHETIST, CERTIFIED REGISTERED

## 2024-04-15 PROCEDURE — 3700000000 HC ANESTHESIA ATTENDED CARE: Performed by: INTERNAL MEDICINE

## 2024-04-15 PROCEDURE — 2580000003 HC RX 258: Performed by: INTERNAL MEDICINE

## 2024-04-15 PROCEDURE — 2709999900 HC NON-CHARGEABLE SUPPLY: Performed by: INTERNAL MEDICINE

## 2024-04-15 PROCEDURE — 7100000011 HC PHASE II RECOVERY - ADDTL 15 MIN: Performed by: INTERNAL MEDICINE

## 2024-04-15 PROCEDURE — 88305 TISSUE EXAM BY PATHOLOGIST: CPT

## 2024-04-15 PROCEDURE — 3600007512: Performed by: INTERNAL MEDICINE

## 2024-04-15 PROCEDURE — 6360000002 HC RX W HCPCS: Performed by: NURSE ANESTHETIST, CERTIFIED REGISTERED

## 2024-04-15 PROCEDURE — 7100000010 HC PHASE II RECOVERY - FIRST 15 MIN: Performed by: INTERNAL MEDICINE

## 2024-04-15 PROCEDURE — 3700000001 HC ADD 15 MINUTES (ANESTHESIA): Performed by: INTERNAL MEDICINE

## 2024-04-15 RX ORDER — SODIUM CHLORIDE 9 MG/ML
25 INJECTION, SOLUTION INTRAVENOUS PRN
Status: DISCONTINUED | OUTPATIENT
Start: 2024-04-15 | End: 2024-04-15 | Stop reason: HOSPADM

## 2024-04-15 RX ORDER — SODIUM CHLORIDE 9 MG/ML
INJECTION, SOLUTION INTRAVENOUS CONTINUOUS
Status: DISCONTINUED | OUTPATIENT
Start: 2024-04-15 | End: 2024-04-15 | Stop reason: HOSPADM

## 2024-04-15 RX ORDER — SODIUM CHLORIDE 0.9 % (FLUSH) 0.9 %
5-40 SYRINGE (ML) INJECTION EVERY 12 HOURS SCHEDULED
Status: DISCONTINUED | OUTPATIENT
Start: 2024-04-15 | End: 2024-04-15 | Stop reason: HOSPADM

## 2024-04-15 RX ORDER — GLYCOPYRROLATE 0.2 MG/ML
INJECTION INTRAMUSCULAR; INTRAVENOUS PRN
Status: DISCONTINUED | OUTPATIENT
Start: 2024-04-15 | End: 2024-04-15 | Stop reason: SDUPTHER

## 2024-04-15 RX ORDER — LIDOCAINE HYDROCHLORIDE 20 MG/ML
INJECTION, SOLUTION EPIDURAL; INFILTRATION; INTRACAUDAL; PERINEURAL PRN
Status: DISCONTINUED | OUTPATIENT
Start: 2024-04-15 | End: 2024-04-15 | Stop reason: SDUPTHER

## 2024-04-15 RX ORDER — SODIUM CHLORIDE 0.9 % (FLUSH) 0.9 %
5-40 SYRINGE (ML) INJECTION PRN
Status: DISCONTINUED | OUTPATIENT
Start: 2024-04-15 | End: 2024-04-15 | Stop reason: HOSPADM

## 2024-04-15 RX ORDER — GABAPENTIN 800 MG/1
800 TABLET ORAL 3 TIMES DAILY
COMMUNITY

## 2024-04-15 RX ADMIN — SODIUM CHLORIDE: 9 INJECTION, SOLUTION INTRAVENOUS at 11:20

## 2024-04-15 RX ADMIN — PROPOFOL 50 MG: 10 INJECTION, EMULSION INTRAVENOUS at 11:54

## 2024-04-15 RX ADMIN — PROPOFOL 50 MG: 10 INJECTION, EMULSION INTRAVENOUS at 11:59

## 2024-04-15 RX ADMIN — PROPOFOL 50 MG: 10 INJECTION, EMULSION INTRAVENOUS at 12:02

## 2024-04-15 RX ADMIN — PROPOFOL 30 MG: 10 INJECTION, EMULSION INTRAVENOUS at 11:50

## 2024-04-15 RX ADMIN — PROPOFOL 50 MG: 10 INJECTION, EMULSION INTRAVENOUS at 11:49

## 2024-04-15 RX ADMIN — PROPOFOL 25 MG: 10 INJECTION, EMULSION INTRAVENOUS at 12:04

## 2024-04-15 RX ADMIN — PROPOFOL 50 MG: 10 INJECTION, EMULSION INTRAVENOUS at 12:18

## 2024-04-15 RX ADMIN — PROPOFOL 100 MG: 10 INJECTION, EMULSION INTRAVENOUS at 11:45

## 2024-04-15 RX ADMIN — PROPOFOL 50 MG: 10 INJECTION, EMULSION INTRAVENOUS at 11:48

## 2024-04-15 RX ADMIN — PROPOFOL 50 MG: 10 INJECTION, EMULSION INTRAVENOUS at 11:47

## 2024-04-15 RX ADMIN — PROPOFOL 50 MG: 10 INJECTION, EMULSION INTRAVENOUS at 11:58

## 2024-04-15 RX ADMIN — PROPOFOL 50 MG: 10 INJECTION, EMULSION INTRAVENOUS at 11:53

## 2024-04-15 RX ADMIN — PROPOFOL 50 MG: 10 INJECTION, EMULSION INTRAVENOUS at 11:52

## 2024-04-15 RX ADMIN — PROPOFOL 50 MG: 10 INJECTION, EMULSION INTRAVENOUS at 12:08

## 2024-04-15 RX ADMIN — PROPOFOL 50 MG: 10 INJECTION, EMULSION INTRAVENOUS at 12:16

## 2024-04-15 RX ADMIN — PROPOFOL 25 MG: 10 INJECTION, EMULSION INTRAVENOUS at 12:05

## 2024-04-15 RX ADMIN — PROPOFOL 50 MG: 10 INJECTION, EMULSION INTRAVENOUS at 12:19

## 2024-04-15 RX ADMIN — PROPOFOL 50 MG: 10 INJECTION, EMULSION INTRAVENOUS at 12:12

## 2024-04-15 RX ADMIN — PROPOFOL 50 MG: 10 INJECTION, EMULSION INTRAVENOUS at 12:21

## 2024-04-15 RX ADMIN — GLYCOPYRROLATE 0.2 MG: 0.2 INJECTION INTRAMUSCULAR; INTRAVENOUS at 11:44

## 2024-04-15 RX ADMIN — LIDOCAINE HYDROCHLORIDE 100 MG: 20 INJECTION, SOLUTION EPIDURAL; INFILTRATION; INTRACAUDAL; PERINEURAL at 11:44

## 2024-04-15 RX ADMIN — PROPOFOL 50 MG: 10 INJECTION, EMULSION INTRAVENOUS at 11:56

## 2024-04-15 ASSESSMENT — PAIN - FUNCTIONAL ASSESSMENT: PAIN_FUNCTIONAL_ASSESSMENT: NONE - DENIES PAIN

## 2024-04-15 NOTE — H&P
54 y.o. female presents for open access colonoscopy for personal history of colon polyps.  Additional H&P data will be attached on the day of procedure.    Jose Juan Jane Jr, MD

## 2024-04-15 NOTE — PROGRESS NOTES

## 2024-04-15 NOTE — DISCHARGE INSTRUCTIONS
[facility]  formerly [facility]  HALEIGH Griffin Jr, MD  [phone]      April 15, 2024    Jael Louie  YOB: 1970    COLONOSCOPY DISCHARGE INSTRUCTIONS    If there is redness at IV site you should apply warm compress to area.  If redness or soreness persist contact Dr. Griffin's office or your primary care doctor.    There may be a slight amount of blood passed from the rectum.  Gaseous discomfort may develop, but walking, belching will help relieve this.  You may not operate a vehicle for 12 hours  You may not operate machinery or dangerous appliances for rest of today  You may not drink alcoholic beverages for 12 hours  Avoid making any critical decisions for 24 hours    DIET:  You may resume your normal diet, but some patients find that heavy or large meals may lead to indigestion or vomiting.  I suggest a light meal as first food intake.    MEDICATIONS:  The use of some over-the-counter pain medication may lead to bleeding after colon biopsies or polyp removal.  Tylenol (also called acetaminophen) is safe to take even if you have had colonoscopy with polyp removal.  Based on the procedure you had today you may safely take aspirin or aspirin-like products for the next seven (7) days.  Remember that Tylenol (also called acetaminophen) is safe to take after colonoscopy even if you have had biopsies or polyps removed.    ACTIVITY:  You may resume your normal household activities, but it is recommended that you spend the remainder of the day resting -  avoid any strenuous activity.    CALL DR. GRIFFIN'S OFFICE IF:  Increasing pain, nausea, vomiting  Abdominal distension (swelling)  Significant new or increased bleeding (oral or rectal)  Fever/Chills  Chest pain/shortness of breath                       Additional instructions:     Impression:  Cecum polyp  Descending colon  no difficulties

## 2024-04-15 NOTE — ANESTHESIA PRE PROCEDURE
Department of Anesthesiology  Preprocedure Note       Name:  Jael Louie   Age:  54 y.o.  :  1970                                          MRN:  [id number]         Date:  4/15/2024      Surgeon: Surgeon(s):  Jose Juan Jane MD    Procedure: Procedure(s):  COLONOSCOPY    Medications prior to admission:   Prior to Admission medications    Medication Sig Start Date End Date Taking? Authorizing Provider   topiramate (TOPAMAX) 100 MG tablet Take 2 tablets by mouth nightly    Automatic Reconciliation, Ar   albuterol (PROVENTIL) (5 MG/ML) 0.5% nebulizer solution Inhale into the lungs every 6 hours as needed    Automatic Reconciliation, Ar   albuterol sulfate HFA (PROAIR HFA) 108 (90 Base) MCG/ACT inhaler as needed 6/11/15   Automatic Reconciliation, Ar   Azelastine-Fluticasone 137-50 MCG/ACT SUSP 1 spray by Nasal route 2 times daily 19   Automatic Reconciliation, Ar   cetirizine (ZYRTEC) 10 MG tablet Take 10 mg by mouth daily 19   Automatic Reconciliation, Ar   citalopram (CELEXA) 20 MG tablet Take 1 tablet by mouth daily 19   Automatic Reconciliation, Ar   EPINEPHrine (EPIPEN) 0.3 MG/0.3ML SOAJ injection ADMINISTER 0.3 ML IN THE MUSCLE 1 TIME AS NEEDED 18   Automatic Reconciliation, Ar   ferrous sulfate (IRON 325) 325 (65 Fe) MG tablet Take by mouth 2 times daily    Automatic Reconciliation, Ar   fluticasone-salmeterol (ADVAIR DISKUS) 500-50 MCG/ACT AEPB diskus inhaler 1 puff 2 times daily 17   Automatic Reconciliation, Ar   guaiFENesin-codeine (TUSSI-ORGANIDIN NR) 100-10 MG/5ML syrup Take 5 mLs by mouth 3 times daily as needed.    Automatic Reconciliation, Ar   hydroCHLOROthiazide (HYDRODIURIL) 25 MG tablet Take 1 tablet by mouth daily 19   Automatic Reconciliation, Ar   ipratropium-albuterol (DUONEB) 0.5-2.5 (3) MG/3ML SOLN nebulizer solution Inhale 3 mLs into the lungs    Automatic Reconciliation, Ar   meclizine (ANTIVERT) 12.5 MG tablet Take 12.5 mg by mouth 3 times daily as

## 2024-04-15 NOTE — OP NOTE
[facility]  [facility]  HALEIGH Jane Jr, MD  [phone]      April 15, 2024    Colonoscopy Procedure Note  Jael Louie  :  1970  [facility] Medical Record Number: [id number]    Indications:   Personal history of colon polyps  PCP:  Mao Tillman MD  Anesthesia/Sedation: See Anesthesia Record  Endoscopist:  Dr. HALEIGH Jane Jr  Complications:  None  Estimated Blood Loss:  None    Surgical assistant: Circulator: Sabine Vega RN; Vanda Mclean RN     Implants none unless otherwise specified.     Permit:  The indications, risks, benefits and alternatives were reviewed with the patient or their decision maker who was provided an opportunity to ask questions and all questions were answered.  The specific risks of colonoscopy with conscious sedation were reviewed, including but not limited to anesthetic complication, bleeding, adverse drug reaction, missed lesion, infection, IV site reactions, and intestinal perforation which would lead to the need for surgical repair.  Alternatives to colonoscopy including radiographic imaging, observation without testing, or laboratory testing were reviewed including the limitations of those alternatives.  After considering the options and having all their questions answered, the patient or their decision maker provided both verbal and written consent to proceed.        Procedure in Detail:  After obtaining informed consent, positioning of the patient in the left lateral decubitus position, and conduction of a pre-procedure pause or \"time out\" the endoscope was introduced into the anus and advanced to the cecum, which was identified by the ileocecal valve and appendiceal orifice.  The quality of the colonic preparation was adequate.  A careful inspection was made as the colonoscope was withdrawn, findings and interventions are described below.    Findings:

## 2024-04-15 NOTE — ANESTHESIA POSTPROCEDURE EVALUATION
Department of Anesthesiology  Postprocedure Note    Patient: Jael Louie  MRN: [id number]  YOB: 1970  Date of evaluation: 4/15/2024    Procedure Summary       Date: 04/15/24 Room / Location: [facility] ENDO 03 / [facility] ENDOSCOPY    Anesthesia Start: 1142 Anesthesia Stop: 1225    Procedure: COLONOSCOPY (Lower GI Region) Diagnosis:       Personal history of colonic polyps      (Personal history of colonic polyps [Z86.010])    Surgeons: Jose Juan Jane MD Responsible Provider: Maximino Aguillon MD    Anesthesia Type: MAC ASA Status: 3            Anesthesia Type: No value filed.    Abeba Phase I: Abeba Score: 10    Abeba Phase II: Abeba Score: 10    Anesthesia Post Evaluation    Patient location during evaluation: PACU  Patient participation: complete - patient participated  Level of consciousness: responsive to verbal stimuli and sleepy but conscious  Pain score: 2  Airway patency: patent  Cardiovascular status: blood pressure returned to baseline  Respiratory status: acceptable  Hydration status: stable  Comments: +Post-Anesthesia Evaluation and Assessment    Patient: Jael Louie MRN: [id number]  SSN: [id number]   YOB: 1970  Age: 54 y.o.  Sex: female          Cardiovascular Function/Vital Signs    BP (!) 111/93   Pulse 65   Temp 98.2 °F (36.8 °C) (Temporal)   Resp 19   Ht 1.575 m (5' 2\")   Wt 127.4 kg (280 lb 12.8 oz)   SpO2 (!) 89%   BMI 51.36 kg/m²     Patient is status post Procedure(s):  COLONOSCOPY.    Nausea/Vomiting: Controlled.    Postoperative hydration reviewed and adequate.    Pain:      Managed.    Neurological Status:       At baseline.    Mental Status and Level of Consciousness: Arousable.    Pulmonary Status:       Adequate oxygenation and airway patent.    Complications related to anesthesia: None    Post-anesthesia assessment completed. No concerns.    I have evaluated the patient and the patient is stable and ready to be discharged from PACU .    Signed

## 2024-04-15 NOTE — INTERVAL H&P NOTE
Pre-Endoscopy H&P Update  Chief complaint/HPI/ROS:  The indication for the procedure, the patient's history and the patient's current medications are reviewed prior to the procedure and that data is reported on the H&P to which this document is attached.  Any significant complaints with regard to organ systems will be noted, and if not mentioned then a review of systems is not contributory.  Past Medical History:   Diagnosis Date    Arthritis     Asthma     BPPV (benign paroxysmal positional vertigo)     Chronic pain     chronic migraines,     Diverticulitis     Endometriosis     Gastrointestinal disorder     reflux, IBS    GERD (gastroesophageal reflux disease)     Hypertension     BP us with migraines     Ill-defined condition     positional vertigo    Migraines     Obesity     ALESSANDRO on CPAP     Seasonal allergic rhinitis     Vertigo       Past Surgical History:   Procedure Laterality Date    COLONOSCOPY N/A 9/13/2019    COLONOSCOPY performed by Jose Juan Jane MD at [facility] ENDOSCOPY    HEENT      removal foreign object from chin as child    HYSTERECTOMY (CERVIX STATUS UNKNOWN)  2017    ORTHOPEDIC SURGERY      ORIF right lower leg    CT UNLISTED PROCEDURE ABDOMEN PERITONEUM & OMENTUM      laparoscopic surgery x 3 for endometriosis     Social   Social History     Tobacco Use    Smoking status: Light Smoker     Current packs/day: 0.10     Types: Cigarettes    Smokeless tobacco: Never    Tobacco comments:     Quit smoking: 3 cigarettes a day   Substance Use Topics    Alcohol use: Yes     Alcohol/week: 3.0 standard drinks of alcohol      Family History   Problem Relation Age of Onset    Stroke Father       Allergies   Allergen Reactions    Aesculus Anaphylaxis    Flavoring Agent Anaphylaxis    Levofloxacin Hives, Itching and Shortness Of Breath    Macadamia Nut Oil Anaphylaxis    Metronidazole Hives, Itching and Swelling     Generalized itching, hives, eyes swelled    Sesame Oil Hives, Itching and Shortness Of Breath

## 2024-06-30 ENCOUNTER — APPOINTMENT (OUTPATIENT)
Facility: HOSPITAL | Age: 54
End: 2024-06-30
Payer: MEDICARE

## 2024-06-30 ENCOUNTER — HOSPITAL ENCOUNTER (EMERGENCY)
Facility: HOSPITAL | Age: 54
Discharge: HOME | End: 2024-06-30
Attending: STUDENT IN AN ORGANIZED HEALTH CARE EDUCATION/TRAINING PROGRAM
Payer: MEDICARE

## 2024-06-30 VITALS
SYSTOLIC BLOOD PRESSURE: 146 MMHG | OXYGEN SATURATION: 98 % | TEMPERATURE: 99 F | DIASTOLIC BLOOD PRESSURE: 93 MMHG | RESPIRATION RATE: 18 BRPM | HEIGHT: 61 IN | HEART RATE: 78 BPM | BODY MASS INDEX: 50.41 KG/M2 | WEIGHT: 267 LBS

## 2024-06-30 DIAGNOSIS — M25.561 ACUTE PAIN OF RIGHT KNEE: Primary | ICD-10-CM

## 2024-06-30 PROCEDURE — 73562 X-RAY EXAM OF KNEE 3: CPT

## 2024-06-30 PROCEDURE — 99283 EMERGENCY DEPT VISIT LOW MDM: CPT

## 2024-06-30 ASSESSMENT — PAIN SCALES - GENERAL: PAINLEVEL_OUTOF10: 8

## 2024-06-30 ASSESSMENT — PAIN DESCRIPTION - ORIENTATION: ORIENTATION: RIGHT

## 2024-06-30 ASSESSMENT — PAIN - FUNCTIONAL ASSESSMENT: PAIN_FUNCTIONAL_ASSESSMENT: 0-10

## 2024-06-30 ASSESSMENT — PAIN DESCRIPTION - LOCATION: LOCATION: KNEE

## 2024-06-30 ASSESSMENT — PAIN DESCRIPTION - DESCRIPTORS: DESCRIPTORS: ACHING

## 2024-06-30 NOTE — ED NOTES
DC instructions reviewed with pt. Pt verbalizes understanding of instructions, f/u care, and demonstrates understanding of proper crutch use. Pt declines need for w/c to ED Lobby and ambulates with crutches to lobby.
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
## 2024-06-30 NOTE — DISCHARGE INSTRUCTIONS
Please follow-up with orthopedic.  Thank you for allowing us to provide you with medical care today.  We realize that you have many choices for your emergency care needs.  We thank you for choosing [facility].  Please choose us in the future for any continued health care needs.     The exam and treatment you received in the Emergency Department were for an emergent problem and are not intended as complete care. It is important that you follow up with a doctor, nurse practitioner, or physician's assistant for ongoing care. If your symptoms worsen or you do not improve as expected and you are unable to reach your usual health care provider, you should return to the Emergency Department. We are available 24 hours a day.     Please make an appointment with your health care provider(s) for follow up of your Emergency Department visit.  Take this sheet with you when you go to your follow-up visit.
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
## (undated) DEVICE — PAD SANIT NPKN 4IN GRD

## (undated) DEVICE — CANNULA CUSH AD W/ 14FT TBG

## (undated) DEVICE — DEVICE SECUREMENT AD W/ TRICOT ANCHR PD FOR F LTX SIL CATH

## (undated) DEVICE — TRAY PREP DRY W/ PREM GLV 2 APPL 6 SPNG 2 UNDPD 1 OVERWRAP

## (undated) DEVICE — AGENT HEMSTAT 5GM ARISTA AH

## (undated) DEVICE — COVER LT HNDL BLU PLAS

## (undated) DEVICE — FORCEPS BX L240CM JAW DIA2.8MM L CAP W/ NDL MIC MESH TOOTH

## (undated) DEVICE — (D)SYR 10ML 1/5ML GRAD NSAF -- PKGING CHANGE USE ITEM 338027

## (undated) DEVICE — SYRINGE MED 20ML STD CLR PLAS LUERLOCK TIP N CTRL DISP

## (undated) DEVICE — 1200 GUARD II KIT W/5MM TUBE W/O VAC TUBE: Brand: GUARDIAN

## (undated) DEVICE — VISUALIZATION SYSTEM: Brand: CLEARIFY

## (undated) DEVICE — REM POLYHESIVE ADULT PATIENT RETURN ELECTRODE: Brand: VALLEYLAB

## (undated) DEVICE — BLADELESS OBTURATOR: Brand: WECK VISTA

## (undated) DEVICE — CONTAINER SPEC 20 ML LID NEUT BUFF FORMALIN 10 % POLYPR STS

## (undated) DEVICE — SOLIDIFIER MEDC 1200ML -- CONVERT TO 356117

## (undated) DEVICE — STERILE POLYISOPRENE POWDER-FREE SURGICAL GLOVES: Brand: PROTEXIS

## (undated) DEVICE — SUTURE MCRYL SZ 4-0 L27IN ABSRB UD L19MM PS-2 1/2 CIR PRIM Y426H

## (undated) DEVICE — Z DISCONTINUED NO SUB IDED TROCAR LAP DIA8MM STD LEN BLDELSS TAPR TIP THRD N OPT VW

## (undated) DEVICE — TUBING FLTR PLUME AWAY EVAC W/ SUCT DEV DISP PUREVIEW

## (undated) DEVICE — ELECTRODE PT RET AD L9FT HI MOIST COND ADH HYDRGEL CORDED

## (undated) DEVICE — 3M™ DURAPORE™ SURGICAL TAPE 1538-3, 3 INCH X 10 YARD (7,5CM X 9,1M), 4 ROLLS/BOX: Brand: 3M™ DURAPORE™

## (undated) DEVICE — CORD ELECSURG BPLR 12 FT DISP [810T818750] [ADLER INSTRUMENT CO]

## (undated) DEVICE — Device

## (undated) DEVICE — ELECTRO LUBE IS A SINGLE PATIENT USE DEVICE THAT IS INTENDED TO BE USED ON ELECTROSURGICAL ELECTRODES TO REDUCE STICKING.: Brand: KEY SURGICAL ELECTRO LUBE

## (undated) DEVICE — ADULT SPO2 SENSOR: Brand: NELLCOR

## (undated) DEVICE — INFECTION CONTROL KIT SYS

## (undated) DEVICE — CUFF BLD PRSS AD SZ 11 FOR 25-34CM 1 TB TRIPURPOSE CONN

## (undated) DEVICE — AIRSEAL 8 MM ACCESS PORT AND LOW PROFILE OBTURATOR WITH BLADELESS OPTICAL TIP, 120 MM LENGTH: Brand: AIRSEAL

## (undated) DEVICE — SUTURE STRATAFIX SPRL PDS + SZ 2-0 L6IN ABSRB VLT L36MM SXPP1B409

## (undated) DEVICE — BLADELESS OPTICAL TROCAR WITH FIXATION CANNULA: Brand: VERSAONE

## (undated) DEVICE — TRAP SURG QUAD PARABOLA SLOT DSGN SFTY SCRN TRAPEASE

## (undated) DEVICE — 3000CC GUARDIAN II: Brand: GUARDIAN

## (undated) DEVICE — POLYP TRAP: Brand: TRAPEASE®

## (undated) DEVICE — BAG SPEC BIOHZRD 10 X 10 IN --

## (undated) DEVICE — TIP COVER ACCESSORY

## (undated) DEVICE — SUTURE V-LOC 180 SZ 0 L9IN ABSRB GRN GS-21 L37MM 1/2 CIR VLOCL0346

## (undated) DEVICE — SURGICAL PROCEDURE PACK GYN LAPAROSCOPY CUST SMH LF

## (undated) DEVICE — SUTURE VCRL SZ 0 L18IN ABSRB UD POLYGLACTIN 910 COAT BRAID J646H

## (undated) DEVICE — SNARE ENDOSCP M L240CM W27MM SHTH DIA2.4MM CHN 2.8MM OVL

## (undated) DEVICE — BITEBLOCK ENDOSCP 60FR MAXI WHT POLYETH STURDY W/ VELC WVN

## (undated) DEVICE — SINGLE-USE POLYPECTOMY SNARE: Brand: CAPTIVATOR

## (undated) DEVICE — BASIN EMSIS 16OZ GRAPHITE PLAS KID SHP MOLD GRAD FOR ORAL

## (undated) DEVICE — VCARE MEDIUM, UTERINE MANIPULATOR, VAGINAL-CERVICAL-AHLUWALIA'S-RETRACTOR-ELEVATOR: Brand: VCARE

## (undated) DEVICE — COVER,TABLE,60X90,STERILE: Brand: MEDLINE

## (undated) DEVICE — KENDALL RADIOLUCENT FOAM MONITORING ELECTRODE -RECTANGULAR SHAPE: Brand: KENDALL

## (undated) DEVICE — 3M™ IOBAN™ 2 ANTIMICROBIAL INCISE DRAPE 6650EZ: Brand: IOBAN™ 2

## (undated) DEVICE — KIT COLON W/ 1.1OZ LUB AND 2 END

## (undated) DEVICE — NEEDLE INSUF L120MM ULT VERES ENDOPATH

## (undated) DEVICE — CAP ENDOSCP SEAL OD13.4MM DSTL ATTACH REVEAL

## (undated) DEVICE — (D)PREP SKN CHLRAPRP APPL 26ML -- CONVERT TO ITEM 371833

## (undated) DEVICE — SEAL UNIV 5-8MM DISP BX/10 -- DA VINCI XI - SNGL USE

## (undated) DEVICE — BAG BELONG PT PERS CLEAR HANDL

## (undated) DEVICE — NEEDLE HYPO 22GA L1.5IN BLK S STL HUB POLYPR SHLD REG BVL

## (undated) DEVICE — SYR IRR CATH TIP LR ADPT 70ML -- CONVERT TO ITEM 363120

## (undated) DEVICE — ARM DRAPE

## (undated) DEVICE — TRAY CATH OD16FR SIL URIN M STATLOK STBL DEV SURSTP

## (undated) DEVICE — DRAPE,REIN 53X77,STERILE: Brand: MEDLINE

## (undated) DEVICE — CATH IV AUTOGRD BC BLU 22GA 25 -- INSYTE

## (undated) DEVICE — DERMABOND SKIN ADH 0.7ML -- DERMABOND ADVANCED 12/BX

## (undated) DEVICE — CLICKLINE SCISSORS INSERT: Brand: CLICKLINE

## (undated) DEVICE — DEVON™ KNEE AND BODY STRAP 60" X 3" (1.5 M X 7.6 CM): Brand: DEVON

## (undated) DEVICE — 3M™ CUROS™ DISINFECTING CAP FOR NEEDLELESS CONNECTORS 270/CARTON 20 CARTONS/CASE CFF1-270: Brand: CUROS™

## (undated) DEVICE — SYSTEM FASCIAL CLSR UNIQUE SHLDED WNG SAFE UNIF CONSISTENT

## (undated) DEVICE — SOLUTION IRRIGATION NACL 0.9% 1000 ML FLX CONTAINER

## (undated) DEVICE — INSTRMT SET WND CLSR SUT PASS --

## (undated) DEVICE — GOWN,SIRUS,FABRNF,XL,20/CS: Brand: MEDLINE